# Patient Record
Sex: MALE | Race: WHITE | NOT HISPANIC OR LATINO | URBAN - METROPOLITAN AREA
[De-identification: names, ages, dates, MRNs, and addresses within clinical notes are randomized per-mention and may not be internally consistent; named-entity substitution may affect disease eponyms.]

---

## 2018-09-05 PROBLEM — Z00.00 ENCOUNTER FOR PREVENTIVE HEALTH EXAMINATION: Status: ACTIVE | Noted: 2018-09-05

## 2020-01-21 ENCOUNTER — INPATIENT (INPATIENT)
Facility: HOSPITAL | Age: 55
LOS: 2 days | Discharge: HOME | End: 2020-01-24
Attending: INTERNAL MEDICINE | Admitting: INTERNAL MEDICINE
Payer: COMMERCIAL

## 2020-01-21 VITALS
SYSTOLIC BLOOD PRESSURE: 147 MMHG | HEART RATE: 100 BPM | DIASTOLIC BLOOD PRESSURE: 94 MMHG | RESPIRATION RATE: 17 BRPM | TEMPERATURE: 98 F | OXYGEN SATURATION: 98 %

## 2020-01-21 DIAGNOSIS — F41.9 ANXIETY DISORDER, UNSPECIFIED: ICD-10-CM

## 2020-01-21 DIAGNOSIS — E86.0 DEHYDRATION: ICD-10-CM

## 2020-01-21 DIAGNOSIS — E87.6 HYPOKALEMIA: ICD-10-CM

## 2020-01-21 DIAGNOSIS — E87.1 HYPO-OSMOLALITY AND HYPONATREMIA: ICD-10-CM

## 2020-01-21 DIAGNOSIS — Z02.9 ENCOUNTER FOR ADMINISTRATIVE EXAMINATIONS, UNSPECIFIED: ICD-10-CM

## 2020-01-21 DIAGNOSIS — R07.9 CHEST PAIN, UNSPECIFIED: ICD-10-CM

## 2020-01-21 DIAGNOSIS — Z29.9 ENCOUNTER FOR PROPHYLACTIC MEASURES, UNSPECIFIED: ICD-10-CM

## 2020-01-21 DIAGNOSIS — I10 ESSENTIAL (PRIMARY) HYPERTENSION: ICD-10-CM

## 2020-01-21 LAB
ALBUMIN SERPL ELPH-MCNC: 3.7 G/DL — SIGNIFICANT CHANGE UP (ref 3.3–5)
ALP SERPL-CCNC: 57 U/L — SIGNIFICANT CHANGE UP (ref 40–120)
ALT FLD-CCNC: 23 U/L — SIGNIFICANT CHANGE UP (ref 4–41)
AMPHET UR-MCNC: NEGATIVE — SIGNIFICANT CHANGE UP
ANION GAP SERPL CALC-SCNC: 12 MMO/L — SIGNIFICANT CHANGE UP (ref 7–14)
APAP SERPL-MCNC: < 15 UG/ML — LOW (ref 15–25)
APPEARANCE UR: CLEAR — SIGNIFICANT CHANGE UP
AST SERPL-CCNC: 21 U/L — SIGNIFICANT CHANGE UP (ref 4–40)
BACTERIA # UR AUTO: NEGATIVE — SIGNIFICANT CHANGE UP
BARBITURATES UR SCN-MCNC: NEGATIVE — SIGNIFICANT CHANGE UP
BASE EXCESS BLDV CALC-SCNC: 1.5 MMOL/L — SIGNIFICANT CHANGE UP
BASOPHILS # BLD AUTO: 0.04 K/UL — SIGNIFICANT CHANGE UP (ref 0–0.2)
BASOPHILS NFR BLD AUTO: 0.4 % — SIGNIFICANT CHANGE UP (ref 0–2)
BENZODIAZ UR-MCNC: NEGATIVE — SIGNIFICANT CHANGE UP
BILIRUB SERPL-MCNC: 0.5 MG/DL — SIGNIFICANT CHANGE UP (ref 0.2–1.2)
BILIRUB UR-MCNC: NEGATIVE — SIGNIFICANT CHANGE UP
BLOOD GAS VENOUS - CREATININE: 0.68 MG/DL — SIGNIFICANT CHANGE UP (ref 0.5–1.3)
BLOOD GAS VENOUS - FIO2: 0 — SIGNIFICANT CHANGE UP
BLOOD UR QL VISUAL: NEGATIVE — SIGNIFICANT CHANGE UP
BUN SERPL-MCNC: 14 MG/DL — SIGNIFICANT CHANGE UP (ref 7–23)
CALCIUM SERPL-MCNC: 9.3 MG/DL — SIGNIFICANT CHANGE UP (ref 8.4–10.5)
CANNABINOIDS UR-MCNC: POSITIVE — SIGNIFICANT CHANGE UP
CHLORIDE BLDV-SCNC: 104 MMOL/L — SIGNIFICANT CHANGE UP (ref 96–108)
CHLORIDE SERPL-SCNC: 98 MMOL/L — SIGNIFICANT CHANGE UP (ref 98–107)
CK SERPL-CCNC: 128 U/L — SIGNIFICANT CHANGE UP (ref 30–200)
CO2 SERPL-SCNC: 23 MMOL/L — SIGNIFICANT CHANGE UP (ref 22–31)
COCAINE METAB.OTHER UR-MCNC: NEGATIVE — SIGNIFICANT CHANGE UP
COLOR SPEC: YELLOW — SIGNIFICANT CHANGE UP
CREAT SERPL-MCNC: 0.66 MG/DL — SIGNIFICANT CHANGE UP (ref 0.5–1.3)
EOSINOPHIL # BLD AUTO: 0.06 K/UL — SIGNIFICANT CHANGE UP (ref 0–0.5)
EOSINOPHIL NFR BLD AUTO: 0.6 % — SIGNIFICANT CHANGE UP (ref 0–6)
ETHANOL BLD-MCNC: < 10 MG/DL — SIGNIFICANT CHANGE UP
GAS PNL BLDV: 132 MMOL/L — LOW (ref 136–146)
GLUCOSE BLDV-MCNC: 104 MG/DL — HIGH (ref 70–99)
GLUCOSE SERPL-MCNC: 107 MG/DL — HIGH (ref 70–99)
GLUCOSE UR-MCNC: NEGATIVE — SIGNIFICANT CHANGE UP
HCO3 BLDV-SCNC: 26 MMOL/L — SIGNIFICANT CHANGE UP (ref 20–27)
HCT VFR BLD CALC: 34.6 % — LOW (ref 39–50)
HCT VFR BLDV CALC: 37.7 % — LOW (ref 39–51)
HGB BLD-MCNC: 12 G/DL — LOW (ref 13–17)
HGB BLDV-MCNC: 12.2 G/DL — LOW (ref 13–17)
HYALINE CASTS # UR AUTO: NEGATIVE — SIGNIFICANT CHANGE UP
IMM GRANULOCYTES NFR BLD AUTO: 0.4 % — SIGNIFICANT CHANGE UP (ref 0–1.5)
KETONES UR-MCNC: NEGATIVE — SIGNIFICANT CHANGE UP
LACTATE BLDV-MCNC: 1.1 MMOL/L — SIGNIFICANT CHANGE UP (ref 0.5–2)
LEUKOCYTE ESTERASE UR-ACNC: NEGATIVE — SIGNIFICANT CHANGE UP
LYMPHOCYTES # BLD AUTO: 1.45 K/UL — SIGNIFICANT CHANGE UP (ref 1–3.3)
LYMPHOCYTES # BLD AUTO: 13.8 % — SIGNIFICANT CHANGE UP (ref 13–44)
MCHC RBC-ENTMCNC: 31.4 PG — SIGNIFICANT CHANGE UP (ref 27–34)
MCHC RBC-ENTMCNC: 34.7 % — SIGNIFICANT CHANGE UP (ref 32–36)
MCV RBC AUTO: 90.6 FL — SIGNIFICANT CHANGE UP (ref 80–100)
METHADONE UR-MCNC: NEGATIVE — SIGNIFICANT CHANGE UP
MONOCYTES # BLD AUTO: 1.17 K/UL — HIGH (ref 0–0.9)
MONOCYTES NFR BLD AUTO: 11.1 % — SIGNIFICANT CHANGE UP (ref 2–14)
NEUTROPHILS # BLD AUTO: 7.78 K/UL — HIGH (ref 1.8–7.4)
NEUTROPHILS NFR BLD AUTO: 73.7 % — SIGNIFICANT CHANGE UP (ref 43–77)
NITRITE UR-MCNC: NEGATIVE — SIGNIFICANT CHANGE UP
NRBC # FLD: 0 K/UL — SIGNIFICANT CHANGE UP (ref 0–0)
OPIATES UR-MCNC: NEGATIVE — SIGNIFICANT CHANGE UP
OXYCODONE UR-MCNC: NEGATIVE — SIGNIFICANT CHANGE UP
PCO2 BLDV: 37 MMHG — LOW (ref 41–51)
PCP UR-MCNC: NEGATIVE — SIGNIFICANT CHANGE UP
PH BLDV: 7.45 PH — HIGH (ref 7.32–7.43)
PH UR: 6 — SIGNIFICANT CHANGE UP (ref 5–8)
PLATELET # BLD AUTO: 362 K/UL — SIGNIFICANT CHANGE UP (ref 150–400)
PMV BLD: 8.9 FL — SIGNIFICANT CHANGE UP (ref 7–13)
PO2 BLDV: 51 MMHG — HIGH (ref 35–40)
POTASSIUM BLDV-SCNC: 3 MMOL/L — LOW (ref 3.4–4.5)
POTASSIUM SERPL-MCNC: 3.2 MMOL/L — LOW (ref 3.5–5.3)
POTASSIUM SERPL-SCNC: 3.2 MMOL/L — LOW (ref 3.5–5.3)
PROT SERPL-MCNC: 6.8 G/DL — SIGNIFICANT CHANGE UP (ref 6–8.3)
PROT UR-MCNC: 20 — SIGNIFICANT CHANGE UP
RBC # BLD: 3.82 M/UL — LOW (ref 4.2–5.8)
RBC # FLD: 13.8 % — SIGNIFICANT CHANGE UP (ref 10.3–14.5)
RBC CASTS # UR COMP ASSIST: SIGNIFICANT CHANGE UP (ref 0–?)
SALICYLATES SERPL-MCNC: < 5 MG/DL — LOW (ref 15–30)
SAO2 % BLDV: 84 % — SIGNIFICANT CHANGE UP (ref 60–85)
SODIUM SERPL-SCNC: 133 MMOL/L — LOW (ref 135–145)
SP GR SPEC: 1.01 — SIGNIFICANT CHANGE UP (ref 1–1.04)
SQUAMOUS # UR AUTO: SIGNIFICANT CHANGE UP
TROPONIN T, HIGH SENSITIVITY: 14 NG/L — SIGNIFICANT CHANGE UP (ref ?–14)
TROPONIN T, HIGH SENSITIVITY: 15 NG/L — SIGNIFICANT CHANGE UP (ref ?–14)
TSH SERPL-MCNC: 1.94 UIU/ML — SIGNIFICANT CHANGE UP (ref 0.27–4.2)
UROBILINOGEN FLD QL: NORMAL — SIGNIFICANT CHANGE UP
WBC # BLD: 10.54 K/UL — HIGH (ref 3.8–10.5)
WBC # FLD AUTO: 10.54 K/UL — HIGH (ref 3.8–10.5)
WBC UR QL: SIGNIFICANT CHANGE UP (ref 0–?)

## 2020-01-21 PROCEDURE — 71046 X-RAY EXAM CHEST 2 VIEWS: CPT | Mod: 26

## 2020-01-21 PROCEDURE — 99223 1ST HOSP IP/OBS HIGH 75: CPT

## 2020-01-21 RX ORDER — ALPRAZOLAM 0.25 MG
0.5 TABLET ORAL ONCE
Refills: 0 | Status: DISCONTINUED | OUTPATIENT
Start: 2020-01-21 | End: 2020-01-21

## 2020-01-21 RX ORDER — LOSARTAN POTASSIUM 100 MG/1
1 TABLET, FILM COATED ORAL
Qty: 0 | Refills: 0 | DISCHARGE

## 2020-01-21 RX ORDER — LOSARTAN POTASSIUM 100 MG/1
50 TABLET, FILM COATED ORAL DAILY
Refills: 0 | Status: DISCONTINUED | OUTPATIENT
Start: 2020-01-21 | End: 2020-01-24

## 2020-01-21 RX ORDER — LANOLIN ALCOHOL/MO/W.PET/CERES
6 CREAM (GRAM) TOPICAL ONCE
Refills: 0 | Status: COMPLETED | OUTPATIENT
Start: 2020-01-21 | End: 2020-01-21

## 2020-01-21 RX ORDER — ONDANSETRON 8 MG/1
4 TABLET, FILM COATED ORAL ONCE
Refills: 0 | Status: COMPLETED | OUTPATIENT
Start: 2020-01-21 | End: 2020-01-21

## 2020-01-21 RX ORDER — AMLODIPINE BESYLATE 2.5 MG/1
5 TABLET ORAL DAILY
Refills: 0 | Status: DISCONTINUED | OUTPATIENT
Start: 2020-01-21 | End: 2020-01-24

## 2020-01-21 RX ORDER — POTASSIUM CHLORIDE 20 MEQ
40 PACKET (EA) ORAL ONCE
Refills: 0 | Status: COMPLETED | OUTPATIENT
Start: 2020-01-21 | End: 2020-01-21

## 2020-01-21 RX ORDER — MAGNESIUM SULFATE 500 MG/ML
1 VIAL (ML) INJECTION ONCE
Refills: 0 | Status: COMPLETED | OUTPATIENT
Start: 2020-01-21 | End: 2020-01-21

## 2020-01-21 RX ORDER — ESCITALOPRAM OXALATE 10 MG/1
1 TABLET, FILM COATED ORAL
Qty: 0 | Refills: 0 | DISCHARGE

## 2020-01-21 RX ORDER — SODIUM CHLORIDE 9 MG/ML
2000 INJECTION INTRAMUSCULAR; INTRAVENOUS; SUBCUTANEOUS ONCE
Refills: 0 | Status: COMPLETED | OUTPATIENT
Start: 2020-01-21 | End: 2020-01-21

## 2020-01-21 RX ORDER — ESCITALOPRAM OXALATE 10 MG/1
10 TABLET, FILM COATED ORAL DAILY
Refills: 0 | Status: DISCONTINUED | OUTPATIENT
Start: 2020-01-21 | End: 2020-01-24

## 2020-01-21 RX ORDER — ASPIRIN/CALCIUM CARB/MAGNESIUM 324 MG
162 TABLET ORAL ONCE
Refills: 0 | Status: COMPLETED | OUTPATIENT
Start: 2020-01-21 | End: 2020-01-21

## 2020-01-21 RX ADMIN — SODIUM CHLORIDE 2000 MILLILITER(S): 9 INJECTION INTRAMUSCULAR; INTRAVENOUS; SUBCUTANEOUS at 17:33

## 2020-01-21 RX ADMIN — Medication 1 MILLIGRAM(S): at 20:40

## 2020-01-21 RX ADMIN — Medication 40 MILLIEQUIVALENT(S): at 17:46

## 2020-01-21 RX ADMIN — Medication 6 MILLIGRAM(S): at 22:18

## 2020-01-21 RX ADMIN — SODIUM CHLORIDE 2000 MILLILITER(S): 9 INJECTION INTRAMUSCULAR; INTRAVENOUS; SUBCUTANEOUS at 15:42

## 2020-01-21 RX ADMIN — ONDANSETRON 4 MILLIGRAM(S): 8 TABLET, FILM COATED ORAL at 17:45

## 2020-01-21 RX ADMIN — Medication 0.1 MILLIGRAM(S): at 17:45

## 2020-01-21 RX ADMIN — Medication 100 GRAM(S): at 22:19

## 2020-01-21 RX ADMIN — Medication 162 MILLIGRAM(S): at 22:18

## 2020-01-21 NOTE — H&P ADULT - NSHPSOCIALHISTORY_GEN_ALL_CORE
Lives with wife  Former smoker 1/2 PPD x 20 years  Social alcohol   No illicit drug use  Weaned off Xanax recently   Works as a baker   Lives with wife  Former smoker 1/2 PPD x 20 years  Social alcohol   No illicit drug use  Weaned off Xanax recently   Works as a baker  Occasional cannabis use

## 2020-01-21 NOTE — ED ADULT NURSE NOTE - OBJECTIVE STATEMENT
54y m AAOx3 c/o "needing help" and for psychiatric evaluation. pt arrives in low acuity  with 2 person PES assist when walking. pt shows impaired walking gait with dragging feet. pt affect flat and has slow and flat responses to questions. pt stated that he may have taken more of his medications than prescribed. pt states he is feeling itchy all over his body and endorses pins/needles sensation with a headache, increased level and anxiety and states he "is too out of it". pt denies SI, HI, AH, and VH at this time. no visual traumas or injuries at this time. pt labs collected and sent, PES within arms reach of stretcher, pt receiving IV fluids at this time. will continue to monitor

## 2020-01-21 NOTE — ED ADULT TRIAGE NOTE - CHIEF COMPLAINT QUOTE
pt c/o insomnia (5 days no sleep), increased anxiety with chest pain. pt noted to have dystonic movements in triage. [employer] to triage, pt for ,.

## 2020-01-21 NOTE — H&P ADULT - PROBLEM SELECTOR PLAN 7
1.  Name of PCP: Dr. Dean Mitchell  2.  PCP Contacted on Admission: [ ] Y    [ ] N    3.  PCP contacted at Discharge: [ ] Y    [ ] N    [ ] N/A  4.  Post-Discharge Appointment Date and Location:  5.  Summary of Handoff given to PCP: Low risk for DVT, no ppx

## 2020-01-21 NOTE — H&P ADULT - NSHPLABSRESULTS_GEN_ALL_CORE
CXR - clear lungs, no pleural effusions - my reading CXR - clear lungs, no pleural effusions - my reading    EKG, 1/21, NSR, 87bpm, QTc 466, no acute Tw or ST changes, LVH - my reading

## 2020-01-21 NOTE — SBIRT NOTE ADULT - NSSBIRTDRGBRIEFINTDET_GEN_A_CORE
Provided SBIRT services: Full screen positive. Brief Intervention Performed. Screening results were reviewed with the patient and patient was provided information about healthy guidelines and potential negative consequences associated with level of risk. Motivation and readiness to reduce or stop use was discussed and goals and activities to make changes were suggested/offered.  Pt is not appropriate for detox due to last use of opioids and benzos being more than 3 weeks ago. Offered pt additional substance use treatment referrals- pt declined. Pt reported attending NA meetings.

## 2020-01-21 NOTE — H&P ADULT - ASSESSMENT
55yo Male, with FHx of MI (father at the age of 65), MHx of anxiety, HTN, self-reported h/o Xanax dependence; a/w CP and severe anxiety

## 2020-01-21 NOTE — H&P ADULT - HISTORY OF PRESENT ILLNESS
53yo Male, with MHx of anxiety with c/o dizziness, nausea, midsternal chest pressure with no radiation, anxiety. Pt accompanied by wife, Danielle , who states  today patient took extra Lexapro Buspar and atarax and after that started to be dizzy, c/o nausea and very tired. Pt with very short attention spam, forgetful and states does not know where he is. States he sees a psychologist for the last couple of yr for anxiety, who put him on Lexapro and today he felt very anxious and took some additional ones. 53yo Male, with FHx of MI (father at the age of 65), MHx of anxiety, HTN, self-reported h/o Xanax dependence; c/o dizziness, nausea, midsternal chest pressure, 6/10, with no radiation, lasting 20 min without provoking factors improved with rest and associated anxiety and nasuea. While in ED the pt was accompanied by his wife, Danielle [phone], who stated that the pt took extra Lexapro Buspar and Atarax today and after that started to be "dizzy", also c/o nausea and being very tired. Per the spouse the pt has very short attention spam and is forgetful. Pt states that has been seeing a psychologist for anxiety for the last couple of years, who put him on Lexapro. Today he felt very anxious and took "some" additional Lexapro tabs. PT states that he feels extremely anxious and needs to rest but does not want to take Xanax as he was just weaned off it. Says that did not have "good sleep" for the past few months and is very tired and requesting to be given "something" that would give him "good long" sleep. Says that had a stress test 1 year ago which was normal; 53yo Male, with FHx of MI (father at the age of 65), MHx of anxiety, HTN, self-reported h/o Xanax dependence; c/o dizziness, nausea, midsternal chest pressure, 6/10, with no radiation, lasting 20 min without provoking factors improved with rest and associated anxiety and nasuea. While in ED the pt was accompanied by his wife, Danielle [phone], who stated that the pt took extra Lexapro (x2,  5mg Tabs) Buspar and Atarax today and after that started to be "dizzy", also c/o nausea and being very tired. Per the spouse the pt has very short attention spam and is forgetful. Pt states that has been seeing a psychologist for anxiety for the last couple of years, who put him on Lexapro. Today he felt very anxious and took "some" additional Lexapro tabs. PT states that he feels extremely anxious and needs to rest but does not want to take Xanax as he was just weaned off it. Says that did not have "good sleep" for the past few months and is very tired and requesting to be given "something" that would give him "good long" sleep. Says that had a stress test 1 year ago which was normal; 55yo Male, former smoker, with FHx of MI (father at the age of 65), MHx of anxiety, HTN, self-reported h/o Xanax dependence; c/o dizziness, nausea, midsternal chest pressure, 6/10, with no radiation, lasting 20 min without provoking factors improved with rest and associated anxiety and nasuea. While in ED the pt was accompanied by his wife, Danielle [phone], who stated that the pt took extra Lexapro (x2,  5mg Tabs) Buspar and Atarax today and after that started to be "dizzy", also c/o nausea and being very tired. Per the spouse the pt has very short attention spam and is forgetful. Pt states that has been seeing a psychologist for anxiety for the last couple of years, who put him on Lexapro. Today he felt very anxious and took "some" additional Lexapro tabs. PT states that he feels extremely anxious and needs to rest but does not want to take Xanax as he was just weaned off it. Says that did not have "good sleep" for the past few months and is very tired and requesting to be given "something" that would give him "good long" sleep. Says that had a stress test 1 year ago which was normal; 55yo Male, former smoker, with FHx of MI (father at the age of 65), MHx of anxiety d/o, HTN, self-reported h/o Xanax dependence; c/o dizziness, nausea, midsternal chest pressure, 6/10, with no radiation, lasting 20 min without provoking factors improved with rest and associated anxiety and nasuea. While in ED the pt was accompanied by his wife, Danielle [phone], who stated that the pt took extra Lexapro (x2,  5mg Tabs) Buspar and Atarax today and after that started to be "dizzy", also c/o nausea and being very tired. Per the spouse the pt has very short attention spam and is forgetful. Pt states that has been seeing a psychologist for anxiety for the last couple of years, who put him on Lexapro. Today he felt very anxious and took "some" additional Lexapro tabs. PT states that he feels extremely anxious and needs to rest but does not want to take Xanax as he was just weaned off it. Says that did not have "good sleep" for the past few months and is very tired and requesting to be given "something" that would give him "good long" sleep. Says that had a stress test 1 year ago which was normal;

## 2020-01-21 NOTE — H&P ADULT - PROBLEM SELECTOR PLAN 8
1.  Name of PCP: Dr. Dean Mitchell  2.  PCP Contacted on Admission: [ ] Y    [ ] N    3.  PCP contacted at Discharge: [ ] Y    [ ] N    [ ] N/A  4.  Post-Discharge Appointment Date and Location:  5.  Summary of Handoff given to PCP:

## 2020-01-21 NOTE — ED PROVIDER NOTE - CONSTITUTIONAL, MLM
normal... Well appearing, awake, alert, oriented to person, place, time/situation and in no apparent distress. Awake, lethargic, oriented to person, , time.

## 2020-01-21 NOTE — ED PROVIDER NOTE - CLINICAL SUMMARY MEDICAL DECISION MAKING FREE TEXT BOX
This is a 54 yr old M, pmh anxiety with c/o dizziness, nausea, chest pressure, anxiety. Pt bib wife from home. ( Danielle )  who states  today patient took extra lexapro, buspar and atarax and after that started to be dizzy, c/o nausea and very tired. Wife states couple of month ago he wanted to quit smoking and had a pulmonology follow up and they put him on xanax to help him with anxiety do to quit smoking. Pt abused xanax and approximately 2 wks ago, he had to go to rehab to detox from benzos.   Screening labs, ua tox/serum, ck, and venous blood gas to r/o underlying medical issues and toxicity.

## 2020-01-21 NOTE — ED PROVIDER NOTE - OBJECTIVE STATEMENT
This is a 54 yr old M, pmh anxiety with c/o dizziness, nausea, chest pressure, anxiety. Pt bib wife from home. ( Danielle ) This is a 54 yr old M, pmh anxiety with c/o dizziness, nausea, chest pressure, anxiety. Pt bib wife from home. ( Danielle )  who states  today patient took extra lexapro, buspar and atarax and after that started to be dizzy, c/o nausea and very tired.   Pt with very short attention spam, forgetful and states does not know where he is. States he sees a psychologist for the last couple of yr for anxiety, who put him on lexapro and today he felt very anxious and took some additional ones.

## 2020-01-21 NOTE — H&P ADULT - PSYCHIATRIC COMMENTS
very hyperactive, requires constant redirection, keeps requesting sleep medication and something for anxiety

## 2020-01-21 NOTE — ED ADULT NURSE REASSESSMENT NOTE - NS ED NURSE REASSESS COMMENT FT1
Patient received from [facility] ED behavioral health, patient restless but redirectable, handoff report received from  LEONORA Pepper. Pending MD assessment. Will continue to monitor.

## 2020-01-21 NOTE — H&P ADULT - PROBLEM SELECTOR PLAN 2
-Avoid Xanax as the pt was weaned off it recently   -Ativan x1 IV in ED  -c/w home regimen -Avoid Xanax as the pt was weaned off it recently   -Ativan x1 IV in ED  -Consider Psych c/s (primary team)   -c/w home regimen

## 2020-01-21 NOTE — H&P ADULT - PROBLEM SELECTOR PLAN 3
Sodium 133; On Lexapro, low dose;  -Monitor closely as on SSRI, may need to hold if sodium trends down Dry MM; BUN: Scr ratio >20  -s/p 2L NS in ED

## 2020-01-21 NOTE — ED PROVIDER NOTE - ATTENDING CONTRIBUTION TO CARE
54M p/w chest pain and nausea - pt took extra buspar, lexapro and atarax prior to arrival due to anxiety, felt dizzy, nausea, CP; pt reporting CP unble to sleep x 5 weeks.  Initially lethargic, poor focus; hx obtained from wife.  Check labs and rx'ed IVF.  Pt misused the xanax and abused it x months, went to rehab for xanax.  Rx'ed fluids.  Pt was c/o leg cramps, poor sleep.  Moved to main for lethargy and fluids.  Initially lethargic and couldn't walk, now agitated and anxious.  COWS score 8 mild bzd w/d.  UTox neg for BZD.   EKG SR at 87 no heena no std (+)LVH.  tw flattening aVL.  Pt very anxious and tremulous, exam otherwise benign.  Reports Cards Dr. Mitchell.  No old EKGs to c/w. 54M p/w chest pain and nausea - pt took extra buspar, lexapro and atarax prior to arrival due to anxiety, felt dizzy, nausea, CP; pt reporting CP unble to sleep x 5 weeks.  Initially lethargic, poor focus; hx obtained from wife.  Check labs and rx'ed IVF.  Pt misused the xanax and abused it x months, went to rehab for xanax.  Rx'ed fluids.  Pt was c/o leg cramps, poor sleep.  Moved to main for lethargy and fluids.  Initially lethargic and couldn't walk, now agitated and anxious.  COWS score 8 mild bzd w/d.  UTox neg for BZD.   EKG SR at 87 no heena no std (+)LVH.  tw flattening aVL.  Pt very anxious and tremulous, exam otherwise benign.  Reports Cards Dr. Mitchell.  No old EKGs to c/w.  VS:  unremarkable    GEN - Anxious and tremulous   A+O x3   HEAD - NC/AT     ENT - PEERL, EOMI, mucous membranes   dry, no discharge      NECK: Neck supple, non-tender without lymphadenopathy, no masses, no JVD  PULM - CTA b/l,  symmetric breath sounds  COR -  normal heart sounds    ABD - , ND, NT, soft,  BACK - no CVA tenderness, nontender spine     EXTREMS - no edema, no deformity, warm and well perfused    SKIN - no rash    or bruising      NEUROLOGIC - alert, face symmetric, speech fluent, sensation nl, motor no focal deficit.

## 2020-01-21 NOTE — ED PROVIDER NOTE - PROGRESS NOTE DETAILS
Fer NP- Patient  evaluated and found he is very lethargic, outbound. Started labs and IVF. Collateral info obtained from wife Danielle- who states  today patient took extra lexapro, buspar and atarax and after that started to be dizzy, c/o nausea and very tired.   I recommend further medical workup, reached out to charge RN for a main placement. DD ED ATTG:  feeling better s/p clonidine.  Agreeable to admission for CP r/o MI.  will send rpt trop

## 2020-01-22 DIAGNOSIS — F13.21 SEDATIVE, HYPNOTIC OR ANXIOLYTIC DEPENDENCE, IN REMISSION: ICD-10-CM

## 2020-01-22 LAB
ANION GAP SERPL CALC-SCNC: 11 MMO/L — SIGNIFICANT CHANGE UP (ref 7–14)
BASOPHILS # BLD AUTO: 0.04 K/UL — SIGNIFICANT CHANGE UP (ref 0–0.2)
BASOPHILS NFR BLD AUTO: 0.6 % — SIGNIFICANT CHANGE UP (ref 0–2)
BUN SERPL-MCNC: 13 MG/DL — SIGNIFICANT CHANGE UP (ref 7–23)
CALCIUM SERPL-MCNC: 9.1 MG/DL — SIGNIFICANT CHANGE UP (ref 8.4–10.5)
CHLORIDE SERPL-SCNC: 105 MMOL/L — SIGNIFICANT CHANGE UP (ref 98–107)
CHOLEST SERPL-MCNC: 97 MG/DL — LOW (ref 120–199)
CO2 SERPL-SCNC: 22 MMOL/L — SIGNIFICANT CHANGE UP (ref 22–31)
CREAT SERPL-MCNC: 0.68 MG/DL — SIGNIFICANT CHANGE UP (ref 0.5–1.3)
EOSINOPHIL # BLD AUTO: 0.13 K/UL — SIGNIFICANT CHANGE UP (ref 0–0.5)
EOSINOPHIL NFR BLD AUTO: 1.9 % — SIGNIFICANT CHANGE UP (ref 0–6)
GLUCOSE BLDC GLUCOMTR-MCNC: 147 MG/DL — HIGH (ref 70–99)
GLUCOSE SERPL-MCNC: 108 MG/DL — HIGH (ref 70–99)
HBA1C BLD-MCNC: 5.1 % — SIGNIFICANT CHANGE UP (ref 4–5.6)
HCT VFR BLD CALC: 34.6 % — LOW (ref 39–50)
HDLC SERPL-MCNC: 39 MG/DL — SIGNIFICANT CHANGE UP (ref 35–55)
HGB BLD-MCNC: 11.5 G/DL — LOW (ref 13–17)
IMM GRANULOCYTES NFR BLD AUTO: 0.3 % — SIGNIFICANT CHANGE UP (ref 0–1.5)
LIPID PNL WITH DIRECT LDL SERPL: 42 MG/DL — SIGNIFICANT CHANGE UP
LYMPHOCYTES # BLD AUTO: 1.36 K/UL — SIGNIFICANT CHANGE UP (ref 1–3.3)
LYMPHOCYTES # BLD AUTO: 20.2 % — SIGNIFICANT CHANGE UP (ref 13–44)
MAGNESIUM SERPL-MCNC: 1.9 MG/DL — SIGNIFICANT CHANGE UP (ref 1.6–2.6)
MCHC RBC-ENTMCNC: 30.9 PG — SIGNIFICANT CHANGE UP (ref 27–34)
MCHC RBC-ENTMCNC: 33.2 % — SIGNIFICANT CHANGE UP (ref 32–36)
MCV RBC AUTO: 93 FL — SIGNIFICANT CHANGE UP (ref 80–100)
MONOCYTES # BLD AUTO: 0.66 K/UL — SIGNIFICANT CHANGE UP (ref 0–0.9)
MONOCYTES NFR BLD AUTO: 9.8 % — SIGNIFICANT CHANGE UP (ref 2–14)
NEUTROPHILS # BLD AUTO: 4.51 K/UL — SIGNIFICANT CHANGE UP (ref 1.8–7.4)
NEUTROPHILS NFR BLD AUTO: 67.2 % — SIGNIFICANT CHANGE UP (ref 43–77)
NRBC # FLD: 0 K/UL — SIGNIFICANT CHANGE UP (ref 0–0)
PHOSPHATE SERPL-MCNC: 2.4 MG/DL — LOW (ref 2.5–4.5)
PLATELET # BLD AUTO: 371 K/UL — SIGNIFICANT CHANGE UP (ref 150–400)
PMV BLD: 9.6 FL — SIGNIFICANT CHANGE UP (ref 7–13)
POTASSIUM SERPL-MCNC: 3.6 MMOL/L — SIGNIFICANT CHANGE UP (ref 3.5–5.3)
POTASSIUM SERPL-SCNC: 3.6 MMOL/L — SIGNIFICANT CHANGE UP (ref 3.5–5.3)
RBC # BLD: 3.72 M/UL — LOW (ref 4.2–5.8)
RBC # FLD: 14 % — SIGNIFICANT CHANGE UP (ref 10.3–14.5)
SODIUM SERPL-SCNC: 138 MMOL/L — SIGNIFICANT CHANGE UP (ref 135–145)
TRIGL SERPL-MCNC: 75 MG/DL — SIGNIFICANT CHANGE UP (ref 10–149)
WBC # BLD: 6.72 K/UL — SIGNIFICANT CHANGE UP (ref 3.8–10.5)
WBC # FLD AUTO: 6.72 K/UL — SIGNIFICANT CHANGE UP (ref 3.8–10.5)

## 2020-01-22 PROCEDURE — 93306 TTE W/DOPPLER COMPLETE: CPT | Mod: 26

## 2020-01-22 PROCEDURE — 90792 PSYCH DIAG EVAL W/MED SRVCS: CPT

## 2020-01-22 RX ORDER — DIPHENHYDRAMINE HCL 50 MG
50 CAPSULE ORAL ONCE
Refills: 0 | Status: COMPLETED | OUTPATIENT
Start: 2020-01-22 | End: 2020-01-22

## 2020-01-22 RX ORDER — LANOLIN ALCOHOL/MO/W.PET/CERES
6 CREAM (GRAM) TOPICAL AT BEDTIME
Refills: 0 | Status: DISCONTINUED | OUTPATIENT
Start: 2020-01-22 | End: 2020-01-24

## 2020-01-22 RX ORDER — NICOTINE POLACRILEX 2 MG
1 GUM BUCCAL DAILY
Refills: 0 | Status: DISCONTINUED | OUTPATIENT
Start: 2020-01-22 | End: 2020-01-24

## 2020-01-22 RX ORDER — QUETIAPINE FUMARATE 200 MG/1
25 TABLET, FILM COATED ORAL EVERY 6 HOURS
Refills: 0 | Status: DISCONTINUED | OUTPATIENT
Start: 2020-01-22 | End: 2020-01-24

## 2020-01-22 RX ORDER — QUETIAPINE FUMARATE 200 MG/1
25 TABLET, FILM COATED ORAL AT BEDTIME
Refills: 0 | Status: DISCONTINUED | OUTPATIENT
Start: 2020-01-22 | End: 2020-01-24

## 2020-01-22 RX ORDER — ASPIRIN/CALCIUM CARB/MAGNESIUM 324 MG
81 TABLET ORAL DAILY
Refills: 0 | Status: DISCONTINUED | OUTPATIENT
Start: 2020-01-22 | End: 2020-01-24

## 2020-01-22 RX ORDER — QUETIAPINE FUMARATE 200 MG/1
25 TABLET, FILM COATED ORAL ONCE
Refills: 0 | Status: COMPLETED | OUTPATIENT
Start: 2020-01-22 | End: 2020-01-23

## 2020-01-22 RX ADMIN — Medication 1 PATCH: at 21:22

## 2020-01-22 RX ADMIN — Medication 50 MILLIGRAM(S): at 02:06

## 2020-01-22 RX ADMIN — Medication 6 MILLIGRAM(S): at 21:22

## 2020-01-22 RX ADMIN — QUETIAPINE FUMARATE 25 MILLIGRAM(S): 200 TABLET, FILM COATED ORAL at 21:22

## 2020-01-22 RX ADMIN — Medication 5 MILLIGRAM(S): at 10:40

## 2020-01-22 RX ADMIN — AMLODIPINE BESYLATE 5 MILLIGRAM(S): 2.5 TABLET ORAL at 06:18

## 2020-01-22 RX ADMIN — Medication 1 PATCH: at 12:47

## 2020-01-22 RX ADMIN — Medication 81 MILLIGRAM(S): at 18:35

## 2020-01-22 RX ADMIN — ESCITALOPRAM OXALATE 10 MILLIGRAM(S): 10 TABLET, FILM COATED ORAL at 12:15

## 2020-01-22 RX ADMIN — QUETIAPINE FUMARATE 25 MILLIGRAM(S): 200 TABLET, FILM COATED ORAL at 18:35

## 2020-01-22 RX ADMIN — LOSARTAN POTASSIUM 50 MILLIGRAM(S): 100 TABLET, FILM COATED ORAL at 06:18

## 2020-01-22 RX ADMIN — Medication 5 MILLIGRAM(S): at 18:35

## 2020-01-22 NOTE — CONSULT NOTE ADULT - SUBJECTIVE AND OBJECTIVE BOX
CARDIOLOGY CONSULT - Dr. Leach     CHIEF COMPLAINT: chest pain      HPI:  55yo Male, former smoker, with FHx of MI (father at the age of 65), MHx of anxiety d/o, HTN, self-reported h/o Xanax dependence; c/o dizziness, nausea, midsternal chest pressure, 6/10, with no radiation, lasting 20 min without provoking factors improved with rest and associated anxiety and nasuea. While in ED the pt was accompanied by his wife, Danielle, who stated that the pt took extra Lexapro (x2,  5mg Tabs) Buspar and Atarax today and after that started to be "dizzy", also c/o nausea and being very tired. Per the spouse the pt has very short attention spam and is forgetful. Pt states that has been seeing a psychologist for anxiety for the last couple of years, who put him on Lexapro. Today he felt very anxious and took "some" additional Lexapro tabs. PT states that he feels extremely anxious and needs to rest but does not want to take Xanax as he was just weaned off it. Says that did not have "good sleep" for the past few months. Pt. denies sob, palpitations, exertional symptoms, LE edema.     PAST MEDICAL & SURGICAL HISTORY:  HTN (hypertension)  Anxiety          PREVIOUS DIAGNOSTIC TESTING:    [ ] Echocardiogram:   [ ]  Catheterization:   [ ] Stress Test:  	    MEDICATIONS:  MEDICATIONS  (STANDING):  amLODIPine   Tablet 5 milliGRAM(s) Oral daily  busPIRone 5 milliGRAM(s) Oral two times a day  escitalopram 10 milliGRAM(s) Oral daily  losartan 50 milliGRAM(s) Oral daily      FAMILY HISTORY:  FH: heart attack: father, age 65      SOCIAL HISTORY:    [ x] Non-smoker  [ ] Smoker  [ ] Alcohol    Allergies    Allergy Status Unknown    Intolerances    	    REVIEW OF SYSTEMS:  CONSTITUTIONAL: No fever, weight loss, or fatigue  EYES: No eye pain, visual disturbances, or discharge  ENMT:  No difficulty hearing, tinnitus, vertigo; No sinus or throat pain  NECK: No pain or stiffness  RESPIRATORY: No cough, wheezing, chills or hemoptysis; No Shortness of Breath  CARDIOVASCULAR: No chest pain, palpitations, passing out, dizziness, or leg swelling  GASTROINTESTINAL: No abdominal or epigastric pain. No nausea, vomiting, or hematemesis; No diarrhea or constipation. No melena or hematochezia.  GENITOURINARY: No dysuria, frequency, hematuria, or incontinence  NEUROLOGICAL: No headaches, memory loss, loss of strength, numbness, or tremors  SKIN: No itching, burning, rashes, or lesions   	    [ ] All others negative	  [ ] Unable to obtain    PHYSICAL EXAM:  T(C): 36.9 (01-22-20 @ 05:33), Max: 37 (01-22-20 @ 01:47)  HR: 73 (01-22-20 @ 05:33) (73 - 103)  BP: 118/65 (01-22-20 @ 05:33) (106/61 - 183/101)  RR: 18 (01-22-20 @ 05:33) (14 - 18)  SpO2: 100% (01-22-20 @ 05:33) (98% - 100%)  Wt(kg): --  I&O's Summary      Appearance: Normal	  Psychiatry: A & O x 3, Mood & affect appropriate  HEENT:   Normal oral mucosa, PERRL, EOMI	  Lymphatic: No lymphadenopathy  Cardiovascular: Normal S1 S2,RRR, No JVD, No murmurs  Respiratory: Lungs clear to auscultation	  Gastrointestinal:  Soft, Non-tender, + BS	  Skin: No rashes, No ecchymoses, No cyanosis	  Neurologic: Non-focal  Extremities: Normal range of motion, No clubbing, cyanosis or edema  Vascular: Peripheral pulses palpable 2+ bilaterally    TELEMETRY: 	    ECG:  	nsr 88BPM, lvh   RADIOLOGY: < from: Xray Chest 2 Views PA/Lat (01.21.20 @ 18:16) >  IMPRESSION:    Clear lungs.    < end of copied text >    OTHER: 	  	  LABS:	 	    CARDIAC MARKERS:                                  11.5   6.72  )-----------( 371      ( 22 Jan 2020 06:00 )             34.6     01-22    138  |  105  |  13  ----------------------------<  108<H>  3.6   |  22  |  0.68    Ca    9.1      22 Jan 2020 06:00  Phos  2.4     01-22  Mg     1.9     01-22    TPro  6.8  /  Alb  3.7  /  TBili  0.5  /  DBili  x   /  AST  21  /  ALT  23  /  AlkPhos  57  01-21      proBNP:   Lipid Profile:   HgA1c: Hemoglobin A1C, Whole Blood: 5.1 % (01-22 @ 06:00)    TSH: Thyroid Stimulating Hormone, Serum: 1.94 uIU/mL (01-21 @ 15:30) CARDIOLOGY CONSULT - Dr. Leach     CHIEF COMPLAINT: chest pain      HPI:  53yo Male, former smoker, with Family history of MI in father (first MI in 50s s/p stents, then  at 65 2/2 to Mi), hx of anxiety d/o, HTN, self-reported h/o Xanax dependence; c/o dizziness, nausea, midsternal chest pressure, 6/10, with no radiation, lasting 20 min without provoking factors improved with rest and associated anxiety and nasuea. While in ED the pt was accompanied by his wife, Danielle, who stated that the pt took extra Lexapro (x2,  5mg Tabs) Buspar and Atarax today and after that started to be "dizzy", also c/o nausea and being very tired. Per the spouse the pt has very short attention spam and is forgetful. Pt states that has been seeing a psychologist for anxiety for the last couple of years, who put him on Lexapro. Today he felt very anxious and took "some" additional Lexapro tabs. PT states that he feels extremely anxious and needs to rest but does not want to take Xanax as he was just weaned off it. Says that did not have "good sleep" for the past few months. Pt. denies sob, palpitations, exertional symptoms, LE edema. Pt. states he sees Dr. Dean Mitchell [facility], recently seen approx. 6 months ago, has routine stress testing and echo which was normal.  Pt. very anxious at time of assessment, stating he needs something to help him sleep, requesting ativan. Primary team notified.     PAST MEDICAL & SURGICAL HISTORY:  HTN (hypertension)  Anxiety          PREVIOUS DIAGNOSTIC TESTING:    [ ] Echocardiogram:   [ ]  Catheterization:   [ ] Stress Test:  	    MEDICATIONS:  MEDICATIONS  (STANDING):  amLODIPine   Tablet 5 milliGRAM(s) Oral daily  busPIRone 5 milliGRAM(s) Oral two times a day  escitalopram 10 milliGRAM(s) Oral daily  losartan 50 milliGRAM(s) Oral daily      FAMILY HISTORY:  FH: heart attack: father, age 65      SOCIAL HISTORY:    [ x] Non-smoker  [ ] Smoker  [ ] Alcohol    Allergies    Allergy Status Unknown    Intolerances    	    REVIEW OF SYSTEMS:  CONSTITUTIONAL: No fever, weight loss, or fatigue  EYES: No eye pain, visual disturbances, or discharge  ENMT:  No difficulty hearing, tinnitus, vertigo; No sinus or throat pain  NECK: No pain or stiffness  RESPIRATORY: No cough, wheezing, chills or hemoptysis; No Shortness of Breath  CARDIOVASCULAR: No chest pain, palpitations, passing out, dizziness, or leg swelling  GASTROINTESTINAL: No abdominal or epigastric pain. No nausea, vomiting, or hematemesis; No diarrhea or constipation. No melena or hematochezia.  GENITOURINARY: No dysuria, frequency, hematuria, or incontinence  NEUROLOGICAL: No headaches, memory loss, loss of strength, numbness, or tremors  SKIN: No itching, burning, rashes, or lesions   	    [ ] All others negative	  [ ] Unable to obtain    PHYSICAL EXAM:  T(C): 36.9 (20 @ 05:33), Max: 37 (20 @ 01:47)  HR: 73 (20 @ 05:33) (73 - 103)  BP: 118/65 (20 @ 05:33) (106/61 - 183/101)  RR: 18 (20 @ 05:33) (14 - 18)  SpO2: 100% (20 @ 05:33) (98% - 100%)  Wt(kg): --  I&O's Summary      Appearance: anxious   Psychiatry: A & O x 3, Mood & affect appropriate  HEENT:   Normal oral mucosa, PERRL, EOMI	  Lymphatic: No lymphadenopathy  Cardiovascular: Normal S1 S2,RRR, No JVD, No murmurs  Respiratory: Lungs clear to auscultation	  Gastrointestinal:  Soft, Non-tender, + BS	  Skin: No rashes, No ecchymoses, No cyanosis	  Neurologic: Non-focal  Extremities: Normal range of motion, No clubbing, cyanosis or edema  Vascular: Peripheral pulses palpable 2+ bilaterally    TELEMETRY: 	    ECG:  	nsr 88BPM, lvh   RADIOLOGY: < from: Xray Chest 2 Views PA/Lat (20 @ 18:16) >  IMPRESSION:    Clear lungs.    < end of copied text >    OTHER: 	  	  LABS:	 	    CARDIAC MARKERS:                                  11.5   6.72  )-----------( 371      ( 2020 06:00 )             34.6         138  |  105  |  13  ----------------------------<  108<H>  3.6   |  22  |  0.68    Ca    9.1      2020 06:00  Phos  2.4       Mg     1.9         TPro  6.8  /  Alb  3.7  /  TBili  0.5  /  DBili  x   /  AST  21  /  ALT  23  /  AlkPhos  57        proBNP:   Lipid Profile:   HgA1c: Hemoglobin A1C, Whole Blood: 5.1 % ( @ 06:00)    TSH: Thyroid Stimulating Hormone, Serum: 1.94 uIU/mL ( @ 15:30)

## 2020-01-22 NOTE — BEHAVIORAL HEALTH ASSESSMENT NOTE - NSBHCHARTREVIEWVS_PSY_A_CORE FT
Vital Signs Last 24 Hrs  T(C): 36.6 (22 Jan 2020 15:40), Max: 37 (22 Jan 2020 01:47)  T(F): 97.9 (22 Jan 2020 15:40), Max: 98.6 (22 Jan 2020 01:47)  HR: 85 (22 Jan 2020 15:40) (73 - 90)  BP: 149/91 (22 Jan 2020 15:40) (106/61 - 149/91)  BP(mean): --  RR: 19 (22 Jan 2020 15:40) (14 - 19)  SpO2: 97% (22 Jan 2020 15:40) (97% - 100%)

## 2020-01-22 NOTE — PROGRESS NOTE ADULT - PROBLEM SELECTOR PLAN 1
Cardiac vs psychiatric etiology due to anxiety vs panic ?attack;  Former smoker, FHx of MI in father, age 65; Low suspicion of ACS at this time;   -Telemetry  -Trend CE  -Cardiology c/s appreciated. outpt card Dr. Dean Mitchell  -EKG: no acute Tw or ST changes, repeat PRN CP  -TTE  -TSH wnl  -CXR: not contributory   -f/u A1c, Lipid panel  - NM stress test given family history

## 2020-01-22 NOTE — BEHAVIORAL HEALTH ASSESSMENT NOTE - HPI (INCLUDE ILLNESS QUALITY, SEVERITY, DURATION, TIMING, CONTEXT, MODIFYING FACTORS, ASSOCIATED SIGNS AND SYMPTOMS)
53yo Male, PPH of generalized anxiety d/o, former benzo abuse now in remission, PMH of HTN, admitted for chest pain and cardiac workup. Psych consult for anxiety.    Patient very anxious on exam, repeatedly asks if writer can help with sleep. States that he began to feel anxious >7mo ago, after which he began to see NP for psych at [facility] who gave him lexapro and xanax, but patient began to abuse xanax which caused worsening anxiety, insomnia, after which he detox from it by admitting himself to [facility] >3 weeks ago. Denies depression or anhedonia, feels "numb" at times due to lack of sleep, has weight loss and mild loss of appetite, feels fatigued restless all the time. Denies SI/I/P or Hi/I/P. 55yo Male, PPH of generalized anxiety d/o, former benzo abuse now in remission, PMH of HTN, admitted for chest pain and cardiac workup. Psych consult for anxiety.    Patient very anxious on exam, repeatedly asks if writer can help with sleep. States that he began to feel anxious >7mo ago, after which he began to see NP for psych at [facility] who gave him lexapro and xanax, but patient began to abuse xanax which caused worsening anxiety, insomnia, after which he detox from it by admitting himself to [facility] >3 weeks ago. Denies depression or anhedonia, feels "numb" at times due to lack of sleep, has weight loss and mild loss of appetite, feels fatigued restless all the time. Denies SI/I/P or Hi/I/P. Denies AH/VH, delusions or paranoia. 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     Wife corrborates above account, denies recent benzo abuse or etoh abuse, denies safety concerns.

## 2020-01-22 NOTE — BEHAVIORAL HEALTH ASSESSMENT NOTE - NSBHCONSULTMEDS_PSY_A_CORE FT
- begin seroquel 25mg qHS standing for insomnia, IF patient does not achieve sleep after 1h of seroquel dose OK to give another 25mg dose (total 50mg qHS)  - begin seroquel 12.5mg q6h prn anxiety  - begin melatonin 6mg qHS standing  - holistic RN for relaxation therapties/techniques - begin seroquel 25mg qHS standing for insomnia, IF patient does not achieve sleep after 1h of seroquel dose OK to give another 25mg dose (total 50mg qHS)  - begin seroquel 25mg q6h prn anxiety  - begin melatonin 6mg qHS standing  - holistic RN for relaxation therapties/techniques

## 2020-01-22 NOTE — BEHAVIORAL HEALTH ASSESSMENT NOTE - SUMMARY
55yo Male, PPH of generalized anxiety d/o, former benzo abuse now in remission, PMH of HTN, admitted for chest pain and cardiac workup. Psych consult for anxiety.    Patient meets criterion for generalized anxiety d/o. Would benefit from higher lexapro dose and secondary acute anxiety agent that does not have addictive or tolerance building properties. Did poorly on vistaril in past, became addicted and tolerant to benzos in recent past would avoid class.     Reviewed off label use of seroquel for anxiety, insomnia, mood d/o. Amenable to use as standing agent and PRN. QTc<500. Risks/benefits discussed.

## 2020-01-22 NOTE — PROGRESS NOTE ADULT - PROBLEM SELECTOR PLAN 2
-Avoid Xanax as the pt was weaned off it recently   -Ativan x1 IV in ED  -Psych c/s appreciated, QHS Seroquel and PRN for insomnia. melatonin started.   -c/w home regimen

## 2020-01-22 NOTE — BEHAVIORAL HEALTH ASSESSMENT NOTE - NSBHCHARTREVIEWLAB_PSY_A_CORE FT
11.5   6.72  )-----------( 371      ( 2020 06:00 )             34.6     01-    138  |  105  |  13  ----------------------------<  108<H>  3.6   |  22  |  0.68    Ca    9.1      2020 06:00  Phos  2.4       Mg     1.9         TPro  6.8  /  Alb  3.7  /  TBili  0.5  /  DBili  x   /  AST  21  /  ALT  23  /  AlkPhos  57      Urinalysis Basic - ( 2020 15:10 )    Color: YELLOW / Appearance: CLEAR / S.015 / pH: 6.0  Gluc: NEGATIVE / Ketone: NEGATIVE  / Bili: NEGATIVE / Urobili: NORMAL   Blood: NEGATIVE / Protein: 20 / Nitrite: NEGATIVE   Leuk Esterase: NEGATIVE / RBC: 3-5 / WBC 0-2   Sq Epi: OCC / Non Sq Epi: x / Bacteria: NEGATIVE      LIVER FUNCTIONS - ( 2020 15:30 )  Alb: 3.7 g/dL / Pro: 6.8 g/dL / ALK PHOS: 57 u/L / ALT: 23 u/L / AST: 21 u/L / GGT: x           Thyroid Stimulating Hormone, Serum (20 @ 15:30)    Thyroid Stimulating Hormone, Serum: 1.94 uIU/mL

## 2020-01-22 NOTE — BEHAVIORAL HEALTH ASSESSMENT NOTE - SUICIDE PROTECTIVE FACTORS
Identifies reasons for living/Has future plans/Engaged in work or school/Responsibility to family and others

## 2020-01-22 NOTE — PROGRESS NOTE ADULT - SUBJECTIVE AND OBJECTIVE BOX
Patient is a 54y old  Male who presents with a chief complaint of Chest pain (2020 10:05)      SUBJECTIVE / OVERNIGHT EVENTS:  brief syncopal event pre-stress test, postponed.  tele only with PVC occasionally  anxious at times.  "please leave me alone, I really need to take a rest/nap"  no cp, no sob, no n/v/d. no abdominal pain.  no headache, no dizziness.       Vital Signs Last 24 Hrs  T(C): 36.6 (2020 15:40), Max: 37 (2020 01:47)  T(F): 97.9 (2020 15:40), Max: 98.6 (2020 01:47)  HR: 85 (2020 15:40) (73 - 90)  BP: 149/91 (2020 15:40) (106/61 - 149/91)  BP(mean): --  RR: 19 (2020 15:40) (14 - 19)  SpO2: 97% (2020 15:40) (97% - 100%)  I&O's Summary      PHYSICAL EXAM:  GENERAL: NAD, Comfortable  HEAD:  Atraumatic, Normocephalic  EYES: EOMI, PERRLA, conjunctiva and sclera clear  NECK: Supple, No JVD  CHEST/LUNG: Clear to auscultation bilaterally; No wheeze  HEART: Regular rate and rhythm; No murmurs, rubs, or gallops  ABDOMEN: Soft, Nontender, Nondistended; Bowel sounds present  Neuro: AAO x 3, no focal deficit, 5/5 b/l extremities  EXTREMITIES:  2+ Peripheral Pulses, No clubbing, cyanosis, or edema  SKIN: No rashes or lesions    LABS:                        11.5   6.72  )-----------( 371      ( 2020 06:00 )             34.6     -    138  |  105  |  13  ----------------------------<  108<H>  3.6   |  22  |  0.68    Ca    9.1      2020 06:00  Phos  2.4       Mg     1.9         TPro  6.8  /  Alb  3.7  /  TBili  0.5  /  DBili  x   /  AST  21  /  ALT  23  /  AlkPhos  57        CAPILLARY BLOOD GLUCOSE      POCT Blood Glucose.: 147 mg/dL (2020 14:45)    CARDIAC MARKERS ( 2020 15:30 )  x     / x     / 128 u/L / x     / x          Urinalysis Basic - ( 2020 15:10 )    Color: YELLOW / Appearance: CLEAR / S.015 / pH: 6.0  Gluc: NEGATIVE / Ketone: NEGATIVE  / Bili: NEGATIVE / Urobili: NORMAL   Blood: NEGATIVE / Protein: 20 / Nitrite: NEGATIVE   Leuk Esterase: NEGATIVE / RBC: 3-5 / WBC 0-2   Sq Epi: OCC / Non Sq Epi: x / Bacteria: NEGATIVE        RADIOLOGY & ADDITIONAL TESTS:    Imaging Personally Reviewed:  [x] YES  [ ] NO    Consultant(s) Notes Reviewed:  [x] YES  [ ] NO      MEDICATIONS  (STANDING):  amLODIPine   Tablet 5 milliGRAM(s) Oral daily  aspirin enteric coated 81 milliGRAM(s) Oral daily  busPIRone 5 milliGRAM(s) Oral two times a day  escitalopram 10 milliGRAM(s) Oral daily  losartan 50 milliGRAM(s) Oral daily  melatonin 6 milliGRAM(s) Oral at bedtime  nicotine - 21 mG/24Hr(s) Patch 1 patch Transdermal daily  QUEtiapine 25 milliGRAM(s) Oral at bedtime    MEDICATIONS  (PRN):  QUEtiapine 25 milliGRAM(s) Oral once PRN insomnia  QUEtiapine 25 milliGRAM(s) Oral every 6 hours PRN anxiety      Care Discussed with Consultants/Other Providers [x] YES  [ ] NO    HEALTH ISSUES - PROBLEM Dx:  Benzodiazepine dependence in remission  Anxiety disorder, unspecified type: Anxiety disorder, unspecified type  Dehydration: Dehydration  Discharge planning issues: Discharge planning issues  Need for prophylactic measure: Need for prophylactic measure  Essential hypertension: Essential hypertension  Hypokalemia: Hypokalemia  Hyponatremia: Hyponatremia  Anxiety: Anxiety  Chest pain, unspecified type: Chest pain, unspecified type

## 2020-01-22 NOTE — CONSULT NOTE ADULT - ASSESSMENT
53yo Male, with FHx of MI (father at the age of 65), MHx of anxiety, HTN, self-reported h/o Xanax dependence; a/w CP and severe anxiety.     1. AtypIcal Chest pain  in the setting of severe anxiety disorder  HST neg x 2,CK normal, EKG with no acute ischemia  cxr clear, no evidence of ADHF on exam  check echo to eval LVEF, valve function     2. HTN  bp stable   continue current med regimen     3. anxiety  psych, med f/u     dvt ppx 55yo Male, with Family history of MI in father (first MI in 50s s/p stents, then  at 65 2/2 to MI), Hx of anxiety, HTN, self-reported h/o Xanax dependence; a/w CP and severe anxiety.     1. AtypIcal Chest pain  in the setting of severe anxiety disorder  HST neg x 2,CK normal, EKG with no acute ischemia  as recent stress test/echo within past year normal   cxr clear, no evidence of ADHF on exam  check echo to eval LVEF, valve function   would start asa 81mg daily as ppx given strong family hx CAD     2. HTN  bp stable   continue current med regimen     3. severe anxiety  psych, med f/u     dvt ppx 53yo Male, with Family history of MI in father (first MI in 50s s/p stents, then  at 65 2/2 to MI), Hx of anxiety, HTN, self-reported h/o Xanax dependence; a/w CP and severe anxiety.     1. AtypIcal Chest pain  in the setting of severe anxiety disorder  HST neg x 2,CK normal, EKG with no acute ischemia  cxr clear, no evidence of ADHF on exam  check echo to eval LVEF, valve function   given family history, plan for stress test to r/o ischemia   start asa 81mg daily as ppx given strong family hx CAD     2. HTN  bp stable   continue current med regimen     3. severe anxiety  psych, med f/u     dvt ppx

## 2020-01-22 NOTE — CONSULT NOTE ADULT - ATTENDING COMMENTS
agree with the above assessment and plan by BRETT Escalera.  55yo Male, with Family history of MI in father (first MI in 50s s/p stents, then  at 65 2/2 to MI), Hx of anxiety, HTN, self-reported h/o Xanax dependence; a/w CP in setting of anxiety  symptoms may be related to underlying anxiety  given his risk factors an ischemic eval is reasonable  recommend exercise nuclear stress

## 2020-01-22 NOTE — PATIENT PROFILE ADULT - FUNCTIONAL SCREEN CURRENT LEVEL: DRESSING, MLM
Other (Free Text): Recommended to see Aesthetcian for possible chemical peels, laser ect.
Other (Free Text): Referred to  for consult.  Would benefit from IPL or series of chemical peels.
Note Text (......Xxx Chief Complaint.): This diagnosis correlates with the
Detail Level: Detailed
0 = independent

## 2020-01-22 NOTE — PROGRESS NOTE ADULT - ASSESSMENT
55 yo Male, with FHx of MI (father at the age of 65), MHx of anxiety, HTN, self-reported h/o Xanax dependence; a/w CP and severe anxiety.

## 2020-01-23 PROCEDURE — 78452 HT MUSCLE IMAGE SPECT MULT: CPT | Mod: 26

## 2020-01-23 PROCEDURE — 93018 CV STRESS TEST I&R ONLY: CPT | Mod: GC

## 2020-01-23 PROCEDURE — 93016 CV STRESS TEST SUPVJ ONLY: CPT | Mod: GC

## 2020-01-23 RX ADMIN — QUETIAPINE FUMARATE 25 MILLIGRAM(S): 200 TABLET, FILM COATED ORAL at 13:53

## 2020-01-23 RX ADMIN — QUETIAPINE FUMARATE 25 MILLIGRAM(S): 200 TABLET, FILM COATED ORAL at 19:53

## 2020-01-23 RX ADMIN — AMLODIPINE BESYLATE 5 MILLIGRAM(S): 2.5 TABLET ORAL at 05:11

## 2020-01-23 RX ADMIN — Medication 5 MILLIGRAM(S): at 05:11

## 2020-01-23 RX ADMIN — Medication 1 PATCH: at 19:54

## 2020-01-23 RX ADMIN — Medication 1 MILLIGRAM(S): at 10:11

## 2020-01-23 RX ADMIN — Medication 6 MILLIGRAM(S): at 22:02

## 2020-01-23 RX ADMIN — QUETIAPINE FUMARATE 25 MILLIGRAM(S): 200 TABLET, FILM COATED ORAL at 22:02

## 2020-01-23 RX ADMIN — LOSARTAN POTASSIUM 50 MILLIGRAM(S): 100 TABLET, FILM COATED ORAL at 05:11

## 2020-01-23 RX ADMIN — Medication 1 PATCH: at 07:57

## 2020-01-23 RX ADMIN — QUETIAPINE FUMARATE 25 MILLIGRAM(S): 200 TABLET, FILM COATED ORAL at 02:42

## 2020-01-23 RX ADMIN — QUETIAPINE FUMARATE 25 MILLIGRAM(S): 200 TABLET, FILM COATED ORAL at 05:11

## 2020-01-23 RX ADMIN — Medication 1 PATCH: at 11:25

## 2020-01-23 RX ADMIN — Medication 5 MILLIGRAM(S): at 18:52

## 2020-01-23 RX ADMIN — ESCITALOPRAM OXALATE 10 MILLIGRAM(S): 10 TABLET, FILM COATED ORAL at 11:25

## 2020-01-23 RX ADMIN — Medication 81 MILLIGRAM(S): at 11:25

## 2020-01-23 NOTE — CHART NOTE - NSCHARTNOTEFT_GEN_A_CORE
PT was brought down to stress and became very anxious. Stated also had argument on phone with family members. Stress stated pt became very anxious and ran out of stress lab complaining of intense anxiety. Spoke w/Dr Mykel Sherman to give pt Ativan 1mg po and then will go back to stress. Spoke with pt who states he feels much better. No longer feeling anxious. No chest pain, no SOB, no anxiety. PT to be sent to stress lab now.

## 2020-01-23 NOTE — PROGRESS NOTE ADULT - SUBJECTIVE AND OBJECTIVE BOX
Patient is a 54y old  Male who presents with a chief complaint of Chest pain (2020 19:06)      SUBJECTIVE / OVERNIGHT EVENTS:  went for stress test but canceled this am since he got too anxious  sent back to the floor.  to received anti-anxiety med and reattempt.   feels "embarassed"  no cp, no sob, no n/v/d. no abdominal pain.  no headache, no dizziness.   wants to go back for the test today         Vital Signs Last 24 Hrs  T(C): 37.1 (2020 05:09), Max: 37.2 (2020 20:12)  T(F): 98.7 (2020 05:09), Max: 98.9 (2020 20:12)  HR: 103 (2020 05:09) (85 - 103)  BP: 163/87 (2020 05:09) (147/81 - 163/87)  BP(mean): --  RR: 18 (2020 05:09) (18 - 19)  SpO2: 100% (2020 05:09) (96% - 100%)  I&O's Summary      PHYSICAL EXAM:  GENERAL: NAD, Comfortable, awake alert, sitting up  HEAD:  Atraumatic, Normocephalic  EYES: EOMI, PERRLA, conjunctiva and sclera clear  NECK: Supple, No JVD  CHEST/LUNG: Clear to auscultation bilaterally; No wheeze  HEART: Regular rate and rhythm; No murmurs, rubs, or gallops  ABDOMEN: Soft, Nontender, Nondistended; Bowel sounds present  Neuro: AAO x 3, no focal deficit, 5/5 b/l extremities  EXTREMITIES:  2+ Peripheral Pulses, No clubbing, cyanosis, or edema  SKIN: No rashes or lesions      LABS:                        11.5   6.72  )-----------( 371      ( 2020 06:00 )             34.6     -    138  |  105  |  13  ----------------------------<  108<H>  3.6   |  22  |  0.68    Ca    9.1      2020 06:00  Phos  2.4     -  Mg     1.9         TPro  6.8  /  Alb  3.7  /  TBili  0.5  /  DBili  x   /  AST  21  /  ALT  23  /  AlkPhos  57        CAPILLARY BLOOD GLUCOSE      POCT Blood Glucose.: 147 mg/dL (2020 14:45)    CARDIAC MARKERS ( 2020 15:30 )  x     / x     / 128 u/L / x     / x          Urinalysis Basic - ( 2020 15:10 )    Color: YELLOW / Appearance: CLEAR / S.015 / pH: 6.0  Gluc: NEGATIVE / Ketone: NEGATIVE  / Bili: NEGATIVE / Urobili: NORMAL   Blood: NEGATIVE / Protein: 20 / Nitrite: NEGATIVE   Leuk Esterase: NEGATIVE / RBC: 3-5 / WBC 0-2   Sq Epi: OCC / Non Sq Epi: x / Bacteria: NEGATIVE        RADIOLOGY & ADDITIONAL TESTS:    Imaging Personally Reviewed:  [x] YES  [ ] NO    Consultant(s) Notes Reviewed:  [x] YES  [ ] NO      MEDICATIONS  (STANDING):  amLODIPine   Tablet 5 milliGRAM(s) Oral daily  aspirin enteric coated 81 milliGRAM(s) Oral daily  busPIRone 5 milliGRAM(s) Oral two times a day  escitalopram 10 milliGRAM(s) Oral daily  losartan 50 milliGRAM(s) Oral daily  melatonin 6 milliGRAM(s) Oral at bedtime  nicotine - 21 mG/24Hr(s) Patch 1 patch Transdermal daily  QUEtiapine 25 milliGRAM(s) Oral at bedtime    MEDICATIONS  (PRN):  QUEtiapine 25 milliGRAM(s) Oral every 6 hours PRN anxiety      Care Discussed with Consultants/Other Providers [x] YES  [ ] NO    HEALTH ISSUES - PROBLEM Dx:  Benzodiazepine dependence in remission  Anxiety disorder, unspecified type: Anxiety disorder, unspecified type  Dehydration: Dehydration  Discharge planning issues: Discharge planning issues  Need for prophylactic measure: Need for prophylactic measure  Essential hypertension: Essential hypertension  Hypokalemia: Hypokalemia  Hyponatremia: Hyponatremia  Anxiety: Anxiety  Chest pain, unspecified type: Chest pain, unspecified type

## 2020-01-23 NOTE — PROGRESS NOTE ADULT - ASSESSMENT
53 yo Male, with FHx of MI (father at the age of 65), MHx of anxiety, HTN, self-reported h/o Xanax dependence; a/w CP and severe anxiety.

## 2020-01-23 NOTE — PROGRESS NOTE ADULT - PROBLEM SELECTOR PLAN 1
Cardiac vs psychiatric etiology due to anxiety vs panic ?attack;  Former smoker, FHx of MI in father, age 65; Low suspicion of ACS at this time;   -Telemetry  -Trend CE  -Cardiology c/s appreciated. outpt card Dr. Dean Mitchell  -EKG: no acute Tw or ST changes, repeat PRN CP  -TTE  -TSH wnl  -CXR: not contributory   - NM stress test given family history (plan for ativan x 1 pre-stress test for severe anxiety)

## 2020-01-23 NOTE — PROGRESS NOTE ADULT - SUBJECTIVE AND OBJECTIVE BOX
CARDIOLOGY FOLLOW UP NOTE - DR. ARREDONDO    Subjective:    no chest pain, sob, palpitations    PHYSICAL EXAM:  T(C): 37.1 (01-23-20 @ 05:09), Max: 37.2 (01-22-20 @ 20:12)  HR: 103 (01-23-20 @ 05:09) (92 - 103)  BP: 163/87 (01-23-20 @ 05:09) (147/81 - 163/87)  RR: 18 (01-23-20 @ 05:09) (18 - 18)  SpO2: 100% (01-23-20 @ 05:09) (96% - 100%)  Wt(kg): --  I&O's Summary    Daily Height in cm: 175.26 (22 Jan 2020 21:26)    Daily     Appearance: Normal	  Cardiovascular: Normal S1 S2,RRR, No JVD, No murmurs  Respiratory: Lungs clear to auscultation	  Gastrointestinal:  Soft, Non-tender, + BS	  Extremities: Normal range of motion, No clubbing, cyanosis or edema      Home Medications:  amLODIPine 5 mg oral tablet: 1 tab(s) orally once a day (21 Jan 2020 20:46)  BuSpar 5 mg oral tablet: 1 tab(s) orally 2 times a day (21 Jan 2020 20:45)  Lexapro 10 mg oral tablet: 1 tab(s) orally once a day (21 Jan 2020 20:45)  losartan 50 mg oral tablet: 1 tab(s) orally once a day (21 Jan 2020 20:45)      MEDICATIONS  (STANDING):  amLODIPine   Tablet 5 milliGRAM(s) Oral daily  aspirin enteric coated 81 milliGRAM(s) Oral daily  busPIRone 5 milliGRAM(s) Oral two times a day  escitalopram 10 milliGRAM(s) Oral daily  losartan 50 milliGRAM(s) Oral daily  melatonin 6 milliGRAM(s) Oral at bedtime  nicotine - 21 mG/24Hr(s) Patch 1 patch Transdermal daily  QUEtiapine 25 milliGRAM(s) Oral at bedtime      TELEMETRY: 	    ECG:  	  RADIOLOGY:   DIAGNOSTIC TESTING:  [ ] Echocardiogram:  [ ] Catheterization:  [ ] Stress Test:    OTHER: 	    LABS:	 	    CARDIAC MARKERS:                                11.5   6.72  )-----------( 371      ( 22 Jan 2020 06:00 )             34.6     01-22    138  |  105  |  13  ----------------------------<  108<H>  3.6   |  22  |  0.68    Ca    9.1      22 Jan 2020 06:00  Phos  2.4     01-22  Mg     1.9     01-22      proBNP:     Lipid Profile:   HgA1c:     Creatinine, Serum: 0.68 mg/dL (01-22-20 @ 06:00)  Creatinine, Serum: 0.66 mg/dL (01-21-20 @ 15:30)

## 2020-01-23 NOTE — PROGRESS NOTE ADULT - ASSESSMENT
55yo Male, with Family history of MI in father (first MI in 50s s/p stents, then  at 65 2/2 to MI), Hx of anxiety, HTN, self-reported h/o Xanax dependence; a/w CP and severe anxiety.     1. Atypical Chest pain  -in the setting of severe anxiety disorder  -HST neg x 2,CK normal, EKG with no acute ischemia  -cxr clear, no evidence of ADHF on exam  -await echo to eval LVEF, valve function   -f/u stress results   -cont asa 81mg daily as ppx given strong family hx CAD     2. HTN  -bp stable   -continue current med regimen     3. severe anxiety  -psych, med f/u     dvt ppx

## 2020-01-23 NOTE — PROGRESS NOTE ADULT - PROBLEM SELECTOR PLAN 4
Sodium 133; On Lexapro, low dose;  -Monitor closely as on SSRI, may need to hold if sodium trends down

## 2020-01-24 ENCOUNTER — TRANSCRIPTION ENCOUNTER (OUTPATIENT)
Age: 55
End: 2020-01-24

## 2020-01-24 VITALS
HEART RATE: 95 BPM | SYSTOLIC BLOOD PRESSURE: 152 MMHG | TEMPERATURE: 98 F | RESPIRATION RATE: 16 BRPM | OXYGEN SATURATION: 100 % | DIASTOLIC BLOOD PRESSURE: 97 MMHG

## 2020-01-24 LAB
ANION GAP SERPL CALC-SCNC: 14 MMO/L — SIGNIFICANT CHANGE UP (ref 7–14)
BUN SERPL-MCNC: 21 MG/DL — SIGNIFICANT CHANGE UP (ref 7–23)
CALCIUM SERPL-MCNC: 9.4 MG/DL — SIGNIFICANT CHANGE UP (ref 8.4–10.5)
CHLORIDE SERPL-SCNC: 101 MMOL/L — SIGNIFICANT CHANGE UP (ref 98–107)
CO2 SERPL-SCNC: 22 MMOL/L — SIGNIFICANT CHANGE UP (ref 22–31)
CREAT SERPL-MCNC: 0.54 MG/DL — SIGNIFICANT CHANGE UP (ref 0.5–1.3)
GLUCOSE SERPL-MCNC: 117 MG/DL — HIGH (ref 70–99)
HCT VFR BLD CALC: 37.3 % — LOW (ref 39–50)
HGB BLD-MCNC: 12.3 G/DL — LOW (ref 13–17)
MAGNESIUM SERPL-MCNC: 1.5 MG/DL — LOW (ref 1.6–2.6)
MCHC RBC-ENTMCNC: 31.1 PG — SIGNIFICANT CHANGE UP (ref 27–34)
MCHC RBC-ENTMCNC: 33 % — SIGNIFICANT CHANGE UP (ref 32–36)
MCV RBC AUTO: 94.2 FL — SIGNIFICANT CHANGE UP (ref 80–100)
NRBC # FLD: 0 K/UL — SIGNIFICANT CHANGE UP (ref 0–0)
PLATELET # BLD AUTO: 436 K/UL — HIGH (ref 150–400)
PMV BLD: 9.1 FL — SIGNIFICANT CHANGE UP (ref 7–13)
POTASSIUM SERPL-MCNC: 3.3 MMOL/L — LOW (ref 3.5–5.3)
POTASSIUM SERPL-SCNC: 3.3 MMOL/L — LOW (ref 3.5–5.3)
RBC # BLD: 3.96 M/UL — LOW (ref 4.2–5.8)
RBC # FLD: 13.9 % — SIGNIFICANT CHANGE UP (ref 10.3–14.5)
SODIUM SERPL-SCNC: 137 MMOL/L — SIGNIFICANT CHANGE UP (ref 135–145)
WBC # BLD: 6.36 K/UL — SIGNIFICANT CHANGE UP (ref 3.8–10.5)
WBC # FLD AUTO: 6.36 K/UL — SIGNIFICANT CHANGE UP (ref 3.8–10.5)

## 2020-01-24 PROCEDURE — 99232 SBSQ HOSP IP/OBS MODERATE 35: CPT

## 2020-01-24 RX ORDER — ASPIRIN/CALCIUM CARB/MAGNESIUM 324 MG
1 TABLET ORAL
Qty: 30 | Refills: 0
Start: 2020-01-24 | End: 2020-02-22

## 2020-01-24 RX ORDER — MAGNESIUM SULFATE 500 MG/ML
2 VIAL (ML) INJECTION ONCE
Refills: 0 | Status: COMPLETED | OUTPATIENT
Start: 2020-01-24 | End: 2020-01-24

## 2020-01-24 RX ORDER — POTASSIUM CHLORIDE 20 MEQ
20 PACKET (EA) ORAL ONCE
Refills: 0 | Status: COMPLETED | OUTPATIENT
Start: 2020-01-24 | End: 2020-01-24

## 2020-01-24 RX ORDER — ASPIRIN/CALCIUM CARB/MAGNESIUM 324 MG
1 TABLET ORAL
Qty: 0 | Refills: 0 | DISCHARGE
Start: 2020-01-24

## 2020-01-24 RX ORDER — QUETIAPINE FUMARATE 200 MG/1
1 TABLET, FILM COATED ORAL
Qty: 30 | Refills: 0
Start: 2020-01-24 | End: 2020-02-22

## 2020-01-24 RX ORDER — AMLODIPINE BESYLATE 2.5 MG/1
10 TABLET ORAL DAILY
Refills: 0 | Status: DISCONTINUED | OUTPATIENT
Start: 2020-01-25 | End: 2020-01-24

## 2020-01-24 RX ORDER — AMLODIPINE BESYLATE 2.5 MG/1
5 TABLET ORAL ONCE
Refills: 0 | Status: COMPLETED | OUTPATIENT
Start: 2020-01-24 | End: 2020-01-24

## 2020-01-24 RX ORDER — HYDROXYZINE HCL 10 MG
25 TABLET ORAL ONCE
Refills: 0 | Status: DISCONTINUED | OUTPATIENT
Start: 2020-01-24 | End: 2020-01-24

## 2020-01-24 RX ORDER — LANOLIN ALCOHOL/MO/W.PET/CERES
2 CREAM (GRAM) TOPICAL
Qty: 0 | Refills: 0 | DISCHARGE
Start: 2020-01-24

## 2020-01-24 RX ORDER — QUETIAPINE FUMARATE 200 MG/1
1 TABLET, FILM COATED ORAL
Qty: 60 | Refills: 0
Start: 2020-01-24 | End: 2020-02-22

## 2020-01-24 RX ORDER — AMLODIPINE BESYLATE 2.5 MG/1
1 TABLET ORAL
Qty: 30 | Refills: 0
Start: 2020-01-24 | End: 2020-02-22

## 2020-01-24 RX ORDER — AMLODIPINE BESYLATE 2.5 MG/1
1 TABLET ORAL
Qty: 0 | Refills: 0 | DISCHARGE

## 2020-01-24 RX ORDER — QUETIAPINE FUMARATE 200 MG/1
25 TABLET, FILM COATED ORAL ONCE
Refills: 0 | Status: COMPLETED | OUTPATIENT
Start: 2020-01-24 | End: 2020-01-24

## 2020-01-24 RX ADMIN — Medication 1 PATCH: at 11:20

## 2020-01-24 RX ADMIN — Medication 1 PATCH: at 07:17

## 2020-01-24 RX ADMIN — Medication 1 PATCH: at 11:21

## 2020-01-24 RX ADMIN — Medication 5 MILLIGRAM(S): at 06:01

## 2020-01-24 RX ADMIN — QUETIAPINE FUMARATE 25 MILLIGRAM(S): 200 TABLET, FILM COATED ORAL at 11:21

## 2020-01-24 RX ADMIN — QUETIAPINE FUMARATE 25 MILLIGRAM(S): 200 TABLET, FILM COATED ORAL at 07:16

## 2020-01-24 RX ADMIN — LOSARTAN POTASSIUM 50 MILLIGRAM(S): 100 TABLET, FILM COATED ORAL at 06:01

## 2020-01-24 RX ADMIN — AMLODIPINE BESYLATE 5 MILLIGRAM(S): 2.5 TABLET ORAL at 11:59

## 2020-01-24 RX ADMIN — ESCITALOPRAM OXALATE 10 MILLIGRAM(S): 10 TABLET, FILM COATED ORAL at 11:20

## 2020-01-24 RX ADMIN — QUETIAPINE FUMARATE 25 MILLIGRAM(S): 200 TABLET, FILM COATED ORAL at 02:00

## 2020-01-24 RX ADMIN — AMLODIPINE BESYLATE 5 MILLIGRAM(S): 2.5 TABLET ORAL at 06:01

## 2020-01-24 RX ADMIN — Medication 50 GRAM(S): at 10:14

## 2020-01-24 RX ADMIN — Medication 81 MILLIGRAM(S): at 11:20

## 2020-01-24 RX ADMIN — Medication 20 MILLIEQUIVALENT(S): at 10:14

## 2020-01-24 NOTE — DISCHARGE NOTE NURSING/CASE MANAGEMENT/SOCIAL WORK - PATIENT PORTAL LINK FT
You can access the FollowMyHealth Patient Portal offered by [facility] by registering at the following website: [email or web address]. By joining [facility]’s FollowMyHealth portal, you will also be able to view your health information using other applications (apps) compatible with our system.

## 2020-01-24 NOTE — PROGRESS NOTE ADULT - SUBJECTIVE AND OBJECTIVE BOX
CARDIOLOGY FOLLOW UP - Dr. Leach    CC no cp/sob   events noted, elevated bp 2/2 to anxiety     PHYSICAL EXAM:  T(C): 36.6 (01-24-20 @ 06:00), Max: 37.1 (01-23-20 @ 18:51)  HR: 92 (01-24-20 @ 06:00) (89 - 95)  BP: 187/117 (01-24-20 @ 06:00) (151/96 - 187/117)  RR: 18 (01-24-20 @ 06:00) (18 - 18)  SpO2: 100% (01-24-20 @ 06:00) (99% - 100%)  Wt(kg): --  I&O's Summary    24 Jan 2020 07:01  -  24 Jan 2020 10:28  --------------------------------------------------------  IN: 100 mL / OUT: 0 mL / NET: 100 mL        Appearance: Normal	  Cardiovascular: Normal S1 S2,RRR, No JVD, No murmurs  Respiratory: Lungs clear to auscultation	  Gastrointestinal:  Soft, Non-tender, + BS	  Extremities: Normal range of motion, No clubbing, cyanosis or edema        MEDICATIONS  (STANDING):  amLODIPine   Tablet 5 milliGRAM(s) Oral daily  aspirin enteric coated 81 milliGRAM(s) Oral daily  busPIRone 5 milliGRAM(s) Oral two times a day  escitalopram 10 milliGRAM(s) Oral daily  hydrOXYzine hydrochloride 25 milliGRAM(s) Oral once  losartan 50 milliGRAM(s) Oral daily  melatonin 6 milliGRAM(s) Oral at bedtime  nicotine - 21 mG/24Hr(s) Patch 1 patch Transdermal daily  QUEtiapine 25 milliGRAM(s) Oral at bedtime      TELEMETRY: NSR     ECG:  	  RADIOLOGY:   DIAGNOSTIC TESTING:  [ ] Echocardiogram: < from: Transthoracic Echocardiogram (01.22.20 @ 13:28) >  CONCLUSIONS:  1. Normal trileaflet aortic valve. Minimal aortic  regurgitation.  2. Normal left ventricular internal dimensions and wall  thicknesses.  3. Normal left ventricular systolic function. No segmental  wall motion abnormalities.  4. Normal right ventricular size and function.    < end of copied text >    [ ]  Catheterization:  [ ] Stress Test:  < from: Nuclear Stress Test-Pharmacologic (01.23.20 @ 13:02) >  IMPRESSIONS:Probably Normal Study  * Myocardial Perfusion SPECT results are probably normal.  * Review of raw data shows: The study is of fair technical  qualitywith significant adjacent bowel tracer uptake  * The left ventricle was normal in size. Normal myocardial  perfusion scan,with no evidence of infarction or inducible  ischemia.  * Post-stress gated wall motion analysis was performed  (LVEF = 50 %;LVEDV = 106 ml.), revealing normal LV  function. RV function appeared normal.    < end of copied text >    OTHER: 	    LABS:	 	                                12.3   6.36  )-----------( 436      ( 24 Jan 2020 06:00 )             37.3     01-24    137  |  101  |  21  ----------------------------<  117<H>  3.3<L>   |  22  |  0.54    Ca    9.4      24 Jan 2020 06:00  Mg     1.5     01-24

## 2020-01-24 NOTE — PROGRESS NOTE ADULT - PROBLEM SELECTOR PLAN 2
-Avoid Xanax as the pt was weaned off it recently   -Ativan x1 IV in ED  - will not discharge home on Benzos.   -Psych c/s appreciated, QHS Seroquel and PRN for insomnia. melatonin started.   -c/w home regimen

## 2020-01-24 NOTE — PROGRESS NOTE BEHAVIORAL HEALTH - NSBHCHARTREVIEWLAB_PSY_A_CORE FT
12.3   6.36  )-----------( 436      ( 24 Jan 2020 06:00 )             37.3     01-24    137  |  101  |  21  ----------------------------<  117<H>  3.3<L>   |  22  |  0.54    Ca    9.4      24 Jan 2020 06:00  Mg     1.5     01-24

## 2020-01-24 NOTE — PROGRESS NOTE BEHAVIORAL HEALTH - NSBHCONSULTMEDS_PSY_A_CORE FT
- increase qHS seroquel to 100mg qHS  - begin seroquel 25mg qAM + 25mg qNoon (with meals)  - c/w melatonin 6mg qHS standing  - holistic RN for relaxation therapties/techniques

## 2020-01-24 NOTE — PROVIDER CONTACT NOTE (OTHER) - SITUATION
Pt extremely anxious /117. AM BP meds given. Pt extremely anxious /117. AM BP meds given. Pt not due for seroquel at this time.

## 2020-01-24 NOTE — DISCHARGE NOTE PROVIDER - NSDCCPCAREPLAN_GEN_ALL_CORE_FT
PRINCIPAL DISCHARGE DIAGNOSIS  Diagnosis: Chest pain  Assessment and Plan of Treatment: You came to the hospital with chest pain. You were evaluated by cardiology. You were started on a baby aspirin (81mg) daily due to your family history of heart disease. Your echocardiogram and stress test were normal. Your chest pain was likely secondary to your anxiety.      SECONDARY DISCHARGE DIAGNOSES  Diagnosis: Essential hypertension  Assessment and Plan of Treatment: Continue your blood pressure medications as prescribed. Yout amlodipine was increased.  A low salt diet is recommended.    Diagnosis: Anxiety  Assessment and Plan of Treatment: Psychiatry saw you for your anxiety. You were started on Seroquel to help with your anxiety. Take as prescribed. Follow up with [facility]'s walk-in clinic

## 2020-01-24 NOTE — PROGRESS NOTE ADULT - ASSESSMENT
55yo Male, with Family history of MI in father (first MI in 50s s/p stents, then  at 65 2/2 to MI), Hx of anxiety, HTN, self-reported h/o Xanax dependence; a/w CP and severe anxiety.     1. Atypical Chest pain  -in the setting of severe anxiety disorder  -HST neg x 2,CK normal, EKG with no acute ischemia  -cxr clear, no evidence of ADHF on exam  -echo with normal LVEF, no significant valve disease     -stress test normal   -cont asa 81mg daily as ppx given strong family hx CAD     2. HTN  -bp elevated this am 2/2 to anxiety   -continue current med regimen, increase norvasc to 10mg daily if needed     3. severe anxiety  -psych, med f/u     dvt ppx

## 2020-01-24 NOTE — DISCHARGE NOTE NURSING/CASE MANAGEMENT/SOCIAL WORK - NSDCFUADDAPPT_GEN_ALL_CORE_FT
[facility] walk in clinic [phone] 9am -7p Monday    For long term care may consider: [facility] [phone]  [facility] [phone]

## 2020-01-24 NOTE — DISCHARGE NOTE PROVIDER - HOSPITAL COURSE
53yo Male, with FHx of MI (father at the age of 65), MHx of anxiety, HTN, self-reported h/o Xanax dependence admitted with chest pain and severe anxiety. Cardiology was consulted. Troponins negative and EKG non-ischemic. Patient had a TTE and NST that were unremarkable. Chest pain likely in the setting of severe anxiety. Psychiatry was consulted....        Case discussed with .. on.... The patient is medically stable for discharge and has appropriate follow up. 53yo Male, with FHx of MI (father at the age of 65), MHx of anxiety, HTN, self-reported h/o Xanax dependence admitted with chest pain and severe anxiety. Cardiology was consulted. Troponins negative and EKG non-ischemic. Patient had a TTE and NST that were unremarkable. Chest pain likely in the setting of severe anxiety. Psychiatry was consulted and patient meets criteria for generalized anxiety disordered. Started on seroquel and melatonin. No psychiatric contraindications to discharge, Patient can follow up at the [facility] crisis clinic.         Case discussed with Dr. Holbrook on 1/24/2020. The patient is medically stable for discharge and has appropriate follow up.

## 2020-01-24 NOTE — PROGRESS NOTE BEHAVIORAL HEALTH - NSBHCONSULTFOLLOWAFTERCARE_PSY_A_CORE FT
[facility] [phone] 9am -7p Monday    For long term care may consider: [facility] [phone]  [facility] [phone]  f/u with PMD at [facility]

## 2020-01-24 NOTE — PROGRESS NOTE ADULT - PROBLEM SELECTOR PLAN 1
Cardiac vs psychiatric etiology due to anxiety vs panic ?attack;  Former smoker, FHx of MI in father, age 65; Low suspicion of ACS at this time;   -Telemetry  -Trend CE  -Cardiology c/s appreciated. outpt card Dr. Dean Mitchell  -EKG: no acute Tw or ST changes, repeat PRN CP  -TTE normal LV.   -TSH wnl  -CXR: not contributory   - NM stress test given family history (plan for ativan x 1 pre-stress test for severe anxiety). NM stress test is negative

## 2020-01-24 NOTE — PROGRESS NOTE ADULT - ATTENDING COMMENTS
Agree with above NP note.  cv stable  stress/echo normal   sx likely secondary to anxiety  d/c planning
- Dr. OLGA Holbrook ([facility])  - [phone]
- Dr. OLGA Holbrook ([facility])  - [phone]
await stress test.     - Dr. OLGA Holbrook ([facility])  - [phone]

## 2020-01-24 NOTE — PROGRESS NOTE ADULT - SUBJECTIVE AND OBJECTIVE BOX
Patient is a 54y old  Male who presents with a chief complaint of Chest pain (24 Jan 2020 11:13)      SUBJECTIVE / OVERNIGHT EVENTS:  Feel okay.   complain of insomnia. "I haven't slept for a month"  No overnight event.  No complaints.  Chest pain free. no SOB, no N/V/D.  Denied HA/dizziness, abdominal pain.       Vital Signs Last 24 Hrs  T(C): 36.7 (24 Jan 2020 12:31), Max: 37.1 (23 Jan 2020 18:51)  T(F): 98 (24 Jan 2020 12:31), Max: 98.8 (23 Jan 2020 18:51)  HR: 95 (24 Jan 2020 12:31) (89 - 95)  BP: 152/97 (24 Jan 2020 12:31) (151/96 - 187/117)  BP(mean): --  RR: 16 (24 Jan 2020 12:31) (16 - 18)  SpO2: 100% (24 Jan 2020 12:31) (99% - 100%)  I&O's Summary    24 Jan 2020 07:01  -  24 Jan 2020 14:29  --------------------------------------------------------  IN: 100 mL / OUT: 0 mL / NET: 100 mL      PHYSICAL EXAM:  GENERAL: NAD, Comfortable  HEAD:  Atraumatic, Normocephalic  EYES: EOMI, PERRLA, conjunctiva and sclera clear  NECK: Supple, No JVD  CHEST/LUNG: Clear to auscultation bilaterally; No wheeze  HEART: Regular rate and rhythm; No murmurs, rubs, or gallops  ABDOMEN: Soft, Nontender, Nondistended; Bowel sounds present  Neuro: AAO x 3, no focal deficit, 5/5 b/l extremities  EXTREMITIES:  2+ Peripheral Pulses, No clubbing, cyanosis, or edema  SKIN: No rashes or lesions    LABS:                        12.3   6.36  )-----------( 436      ( 24 Jan 2020 06:00 )             37.3     01-24    137  |  101  |  21  ----------------------------<  117<H>  3.3<L>   |  22  |  0.54    Ca    9.4      24 Jan 2020 06:00  Mg     1.5     01-24        CAPILLARY BLOOD GLUCOSE                RADIOLOGY & ADDITIONAL TESTS:    Imaging Personally Reviewed:  [x] YES  [ ] NO    Consultant(s) Notes Reviewed:  [x] YES  [ ] NO      MEDICATIONS  (STANDING):  aspirin enteric coated 81 milliGRAM(s) Oral daily  busPIRone 5 milliGRAM(s) Oral two times a day  escitalopram 10 milliGRAM(s) Oral daily  hydrOXYzine hydrochloride 25 milliGRAM(s) Oral once  losartan 50 milliGRAM(s) Oral daily  melatonin 6 milliGRAM(s) Oral at bedtime  nicotine - 21 mG/24Hr(s) Patch 1 patch Transdermal daily  QUEtiapine 25 milliGRAM(s) Oral at bedtime    MEDICATIONS  (PRN):  QUEtiapine 25 milliGRAM(s) Oral every 6 hours PRN anxiety      Care Discussed with Consultants/Other Providers [x] YES  [ ] NO    HEALTH ISSUES - PROBLEM Dx:  Benzodiazepine dependence in remission  Anxiety disorder, unspecified type: Anxiety disorder, unspecified type  Dehydration: Dehydration  Discharge planning issues: Discharge planning issues  Need for prophylactic measure: Need for prophylactic measure  Essential hypertension: Essential hypertension  Hypokalemia: Hypokalemia  Hyponatremia: Hyponatremia  Anxiety: Anxiety  Chest pain, unspecified type: Chest pain, unspecified type

## 2020-01-24 NOTE — DISCHARGE NOTE PROVIDER - PROVIDER TOKENS
FREE:[LAST:[Dr. Yaya Gagnon ([facility])],PHONE:[(   )    -],FAX:[(   )    -],ESTABLISHEDPATIENT:[T]]

## 2020-01-24 NOTE — DISCHARGE NOTE PROVIDER - CARE PROVIDER_API CALL
Dr. Yaya Gagnon ([facility]),   Phone: (   )    -  Fax: (   )    -  Established Patient  Follow Up Time:

## 2020-01-24 NOTE — DISCHARGE NOTE PROVIDER - NSDCFUADDAPPT_GEN_ALL_CORE_FT
[facility] [phone] 9am -7p Monday    For long term care may consider: [facility] [phone]  [facility] [phone]

## 2020-01-24 NOTE — PROGRESS NOTE BEHAVIORAL HEALTH - NSBHCHARTREVIEWVS_PSY_A_CORE FT
Vital Signs Last 24 Hrs  T(C): 36.7 (24 Jan 2020 12:31), Max: 37.1 (23 Jan 2020 18:51)  T(F): 98 (24 Jan 2020 12:31), Max: 98.8 (23 Jan 2020 18:51)  HR: 95 (24 Jan 2020 12:31) (89 - 95)  BP: 152/97 (24 Jan 2020 12:31) (151/96 - 187/117)  BP(mean): --  RR: 16 (24 Jan 2020 12:31) (16 - 18)  SpO2: 100% (24 Jan 2020 12:31) (99% - 100%)

## 2020-01-24 NOTE — PROGRESS NOTE BEHAVIORAL HEALTH - NSBHCONSULTFOLLOWDETAILS_PSY_A_CORE FT
As patient wishes for immediate help with insomnia should f/u with [facility] crisis clinic as below, spoke to Crisis Center about appts who stated that it is best patient walks in first thing in the AM.    D/c to home on seroquel 25mg qAM + 25mg qAfternoon (with lunch) + 100mg q9pm  DO NOT discharge with benzodiazapine meds

## 2020-01-24 NOTE — PROGRESS NOTE BEHAVIORAL HEALTH - NSBHFUPINTERVALHXFT_PSY_A_CORE
Patient endorses high anxiety still, states he is sleeping only minimally, denies anhedonia wishes to get better, denies si/i/p or hi/i/p, wishes he could sleep more is amenable to higher dose of seroquel as below. Spoke to patient's wife who is amenable to dose increase, risks/benefits discussed. No safety concerns per patient or wife, wife thinks that the seroquel is helping but only partially. Denies decreased need for sleep, increased energy, racing thoughts, irritability euphoria or grandiosity. Denies AH/VH, delusions or paranoia..

## 2020-01-24 NOTE — PROGRESS NOTE BEHAVIORAL HEALTH - SUMMARY
55yo Male, PPH of generalized anxiety d/o, former benzo abuse now in remission, PMH of HTN, admitted for chest pain and cardiac workup. Psych consult for anxiety.    Patient meets criterion for generalized anxiety d/o, insomnia continues. Denies si/i/p or hi/i/p, has high anxiety about going home as he feels his insomnia has not remitted as it has been going on for >1mo, patient and wife amenable to higher dose of seroquel. Spoke to patient about inpt psych hospitalization which he and wife felt would worsen his anxiety, to which writer agreed as patient seems even more anxious in hospital setting than at home.

## 2020-01-27 ENCOUNTER — OUTPATIENT (OUTPATIENT)
Dept: OUTPATIENT SERVICES | Facility: HOSPITAL | Age: 55
LOS: 1 days | Discharge: HOME | End: 2020-01-27

## 2020-01-27 ENCOUNTER — TRANSCRIPTION ENCOUNTER (OUTPATIENT)
Age: 55
End: 2020-01-27

## 2020-01-27 PROBLEM — F41.9 ANXIETY DISORDER, UNSPECIFIED: Chronic | Status: ACTIVE | Noted: 2020-01-21

## 2020-01-27 PROBLEM — I10 ESSENTIAL (PRIMARY) HYPERTENSION: Chronic | Status: ACTIVE | Noted: 2020-01-21

## 2020-01-28 DIAGNOSIS — F19.10 OTHER PSYCHOACTIVE SUBSTANCE ABUSE, UNCOMPLICATED: ICD-10-CM

## 2020-01-28 DIAGNOSIS — F41.1 GENERALIZED ANXIETY DISORDER: ICD-10-CM

## 2020-03-10 ENCOUNTER — OUTPATIENT (OUTPATIENT)
Dept: OUTPATIENT SERVICES | Facility: HOSPITAL | Age: 55
LOS: 1 days | Discharge: HOME | End: 2020-03-10

## 2020-03-11 DIAGNOSIS — F11.10 OPIOID ABUSE, UNCOMPLICATED: ICD-10-CM

## 2021-02-15 NOTE — ED ADULT NURSE NOTE - NSSUHOSCREENINGYN_ED_ALL_ED
Addended by: ANDERSON LOPEZ on: 2/15/2021 08:42 AM     Modules accepted: Orders     Yes - the patient is able to be screened

## 2021-05-05 ENCOUNTER — TRANSCRIPTION ENCOUNTER (OUTPATIENT)
Age: 56
End: 2021-05-05

## 2021-11-29 NOTE — ED ADULT NURSE NOTE - ISOLATION TYPE:
Nutrition Assessment   Assessment Type: Follow up  Reason for Visit: Registered Dietitian Evaluation  Referral Requested By: Other: RD eval  Chart Medications Lab Results Reviewed: yes    Nutritional Risk Factors: PMH disordered eating    Current Diet Order: Regular, vegetarian  Diet Tolerance: tolerating  Food Allergies: no  Priority Points: Status L1    Demographic/Anthropometrics Information  Gender: female   Patient Age: 27 year old  Height: Height: 5' 4\" (162.6 cm)  Weight: Weight: 53.2 kg  BMI: Body mass index is 20.13 kg/m².    Usual Body Weight: n/a per EMR review    Physical Appearance: Underweight  Weight Classification: Normal weight (BMI 18.5-24.9)    Nutrition Diagnosis (PES)  No nutrition diagnosis at this time     Nutrition Plan  Recommended Nutrition Intervention: Coordination of nutrition care by a nutrition professional and Meals and snacks   Monitor: Biochemical data, medical tests, procedures, Food and beverage intake and Weight    Discharge Needs: Pending  Care Plan Discussed With: Patient  Goals: Meet >/= 75% of estimated needs  Goal Progress: Extended  Timeframe to Achieve Goal: Ongoing    Dietitian Notes/Impressions/Recommendations:  11/8/21: Pt presents with depression and suicidal ideation. PMH schizoaffective disorder, anorexia, nicotine dependence, suicide attempt, delirium, oppositional defiant disorder, and lyme disease per chat review. Pt assessed d/t history disordered eating and concern for recent wt loss-reported by pt mother per MD note. Secure chat with RN who reported unsure of pt PO intake since admission. Spoke with pt standing up at time of encounter- pt declined to speak with dietitian. Of note, pt with history of inpatient and outpatient treatment programs for anorexia nervosa with bulimia per chart review.   Weight Hx: n/a per EMR review  NFPE: unable to complete- visually assessed pt, no noted signs of muscle wasting    11/15/2021: Pt does not want to speak with RD. Spoke  with RN, who reports pt has a good appetite today. Reports she has been eating well and drinking Ensure regularly. No intake documented. No BM documented.    11/22/21: Secure chat with RN who reports PO intake continues to be good, no changes in appetite to report. Labs reviewed.     11/29/2021: Secure chat with RN who reports no changes. Pt continues to have good appetite and PO intake.     TREATMENT PLAN: Monitoring & Interventions   1. Continue regular diet as tolerated   2. Continue Ensure Enlive TID with meals   3. Monitor PO intake, wt, and labs     Malnutrition Status: pt does not meet criteria at this time          None

## 2022-01-23 NOTE — PROGRESS NOTE ADULT - PROBLEM SELECTOR PLAN 4
Goal Outcome Evaluation:  Plan of Care Reviewed With: sibling        Progress: declining  Outcome Summary: premedicated prior to turns with aivan 2mg, morphine 4mg, and robinul 0.8mg. Pt is unresponsive. Has appeared comfortable between care. Family at bedside, aware of pt conitnued decline. Will continue to monitor for comfort.   Sodium 133; On Lexapro, low dose;  -Monitor closely as on SSRI, may need to hold if sodium trends down

## 2022-10-17 ENCOUNTER — EMERGENCY (EMERGENCY)
Facility: HOSPITAL | Age: 57
LOS: 1 days | Discharge: HOME | End: 2022-10-17
Attending: EMERGENCY MEDICINE | Admitting: EMERGENCY MEDICINE

## 2022-10-17 VITALS
OXYGEN SATURATION: 98 % | HEART RATE: 91 BPM | SYSTOLIC BLOOD PRESSURE: 184 MMHG | RESPIRATION RATE: 18 BRPM | TEMPERATURE: 99 F | DIASTOLIC BLOOD PRESSURE: 106 MMHG

## 2022-10-17 VITALS
RESPIRATION RATE: 18 BRPM | SYSTOLIC BLOOD PRESSURE: 175 MMHG | DIASTOLIC BLOOD PRESSURE: 98 MMHG | HEART RATE: 72 BPM | OXYGEN SATURATION: 96 %

## 2022-10-17 LAB
ALBUMIN SERPL ELPH-MCNC: 3.6 G/DL — SIGNIFICANT CHANGE UP (ref 3.4–5)
ALP SERPL-CCNC: 66 U/L — SIGNIFICANT CHANGE UP (ref 40–120)
ALT FLD-CCNC: 20 U/L — SIGNIFICANT CHANGE UP (ref 12–42)
ANION GAP SERPL CALC-SCNC: 4 MMOL/L — LOW (ref 9–16)
AST SERPL-CCNC: 17 U/L — SIGNIFICANT CHANGE UP (ref 15–37)
BASOPHILS # BLD AUTO: 0.06 K/UL — SIGNIFICANT CHANGE UP (ref 0–0.2)
BASOPHILS NFR BLD AUTO: 0.9 % — SIGNIFICANT CHANGE UP (ref 0–2)
BILIRUB SERPL-MCNC: 0.5 MG/DL — SIGNIFICANT CHANGE UP (ref 0.2–1.2)
BUN SERPL-MCNC: 16 MG/DL — SIGNIFICANT CHANGE UP (ref 7–23)
CALCIUM SERPL-MCNC: 9.5 MG/DL — SIGNIFICANT CHANGE UP (ref 8.5–10.5)
CHLORIDE SERPL-SCNC: 105 MMOL/L — SIGNIFICANT CHANGE UP (ref 96–108)
CO2 SERPL-SCNC: 28 MMOL/L — SIGNIFICANT CHANGE UP (ref 22–31)
CREAT SERPL-MCNC: 0.85 MG/DL — SIGNIFICANT CHANGE UP (ref 0.5–1.3)
EGFR: 102 ML/MIN/1.73M2 — SIGNIFICANT CHANGE UP
EOSINOPHIL # BLD AUTO: 0.13 K/UL — SIGNIFICANT CHANGE UP (ref 0–0.5)
EOSINOPHIL NFR BLD AUTO: 2 % — SIGNIFICANT CHANGE UP (ref 0–6)
GLUCOSE SERPL-MCNC: 90 MG/DL — SIGNIFICANT CHANGE UP (ref 70–99)
HCT VFR BLD CALC: 39.4 % — SIGNIFICANT CHANGE UP (ref 39–50)
HGB BLD-MCNC: 13.2 G/DL — SIGNIFICANT CHANGE UP (ref 13–17)
IMM GRANULOCYTES NFR BLD AUTO: 0.2 % — SIGNIFICANT CHANGE UP (ref 0–0.9)
LIDOCAIN IGE QN: 37 U/L — LOW (ref 73–393)
LYMPHOCYTES # BLD AUTO: 1.47 K/UL — SIGNIFICANT CHANGE UP (ref 1–3.3)
LYMPHOCYTES # BLD AUTO: 22.4 % — SIGNIFICANT CHANGE UP (ref 13–44)
MAGNESIUM SERPL-MCNC: 1.8 MG/DL — SIGNIFICANT CHANGE UP (ref 1.6–2.6)
MCHC RBC-ENTMCNC: 30.4 PG — SIGNIFICANT CHANGE UP (ref 27–34)
MCHC RBC-ENTMCNC: 33.5 GM/DL — SIGNIFICANT CHANGE UP (ref 32–36)
MCV RBC AUTO: 90.8 FL — SIGNIFICANT CHANGE UP (ref 80–100)
MONOCYTES # BLD AUTO: 0.62 K/UL — SIGNIFICANT CHANGE UP (ref 0–0.9)
MONOCYTES NFR BLD AUTO: 9.4 % — SIGNIFICANT CHANGE UP (ref 2–14)
NEUTROPHILS # BLD AUTO: 4.28 K/UL — SIGNIFICANT CHANGE UP (ref 1.8–7.4)
NEUTROPHILS NFR BLD AUTO: 65.1 % — SIGNIFICANT CHANGE UP (ref 43–77)
NRBC # BLD: 0 /100 WBCS — SIGNIFICANT CHANGE UP (ref 0–0)
NT-PROBNP SERPL-SCNC: 140 PG/ML — SIGNIFICANT CHANGE UP
PLATELET # BLD AUTO: 264 K/UL — SIGNIFICANT CHANGE UP (ref 150–400)
POTASSIUM SERPL-MCNC: 4.3 MMOL/L — SIGNIFICANT CHANGE UP (ref 3.5–5.3)
POTASSIUM SERPL-SCNC: 4.3 MMOL/L — SIGNIFICANT CHANGE UP (ref 3.5–5.3)
PROT SERPL-MCNC: 7.1 G/DL — SIGNIFICANT CHANGE UP (ref 6.4–8.2)
RBC # BLD: 4.34 M/UL — SIGNIFICANT CHANGE UP (ref 4.2–5.8)
RBC # FLD: 13.7 % — SIGNIFICANT CHANGE UP (ref 10.3–14.5)
SARS-COV-2 RNA SPEC QL NAA+PROBE: SIGNIFICANT CHANGE UP
SODIUM SERPL-SCNC: 137 MMOL/L — SIGNIFICANT CHANGE UP (ref 132–145)
TROPONIN I, HIGH SENSITIVITY RESULT: 10.3 NG/L — SIGNIFICANT CHANGE UP
WBC # BLD: 6.57 K/UL — SIGNIFICANT CHANGE UP (ref 3.8–10.5)
WBC # FLD AUTO: 6.57 K/UL — SIGNIFICANT CHANGE UP (ref 3.8–10.5)

## 2022-10-17 PROCEDURE — 71045 X-RAY EXAM CHEST 1 VIEW: CPT | Mod: 26

## 2022-10-17 PROCEDURE — 93010 ELECTROCARDIOGRAM REPORT: CPT | Mod: NC

## 2022-10-17 PROCEDURE — 99285 EMERGENCY DEPT VISIT HI MDM: CPT | Mod: 25

## 2022-10-17 RX ORDER — SODIUM CHLORIDE 9 MG/ML
1000 INJECTION INTRAMUSCULAR; INTRAVENOUS; SUBCUTANEOUS ONCE
Refills: 0 | Status: COMPLETED | OUTPATIENT
Start: 2022-10-17 | End: 2022-10-17

## 2022-10-17 RX ADMIN — SODIUM CHLORIDE 2000 MILLILITER(S): 9 INJECTION INTRAMUSCULAR; INTRAVENOUS; SUBCUTANEOUS at 10:00

## 2022-10-17 RX ADMIN — SODIUM CHLORIDE 1000 MILLILITER(S): 9 INJECTION INTRAMUSCULAR; INTRAVENOUS; SUBCUTANEOUS at 10:53

## 2022-10-17 NOTE — ED PROVIDER NOTE - TEMPLATE
Adair is a 39 year old man presenting with 1 week history of right sided neck and shoulder discomfort. He has pain that shoots down from right side of neck to the right shoulder.   In February he was seen in urgent care with similar symptoms. He was referred to ortho and PT but his symptoms resolved and he did not pursue treatment.     He has no history of neck injury. There is no family history of neck or back problems.     Problem list reviewed today.     Medications reviewed today.     Allergies as of 08/11/2022   • (No Known Allergies)       Exam:  Well developed, well nourished man in no apparent distress.    Blood pressure 122/74, pulse 99, weight 77.6 kg (171 lb), SpO2 98 %.  Normal appearing neck.   Full range of motion of the neck. Pain aggravated by extension of neck and right schultz turning of head.   Full range of motion at the shoulders.   Deep tendon reflexes 2+/symmetric at biceps, brachioradialis.   5/5  strength, symmetric.     Cervical xray: unremarkable.     Impression:  Cervicalgia, radiculitis affecting right shoulder.   Symptoms occurred in February and now reoccurred.     Plan:  Prednisone x 7 days.   Pain management ordered.     Ryan Degroot MD      General

## 2022-10-17 NOTE — ED PROVIDER NOTE - GASTROINTESTINAL, MLM
Abdomen soft, non-tender, no guarding but old surgical scars noted. Abdomen soft, non-tender, no guarding, and old surgical scars noted.

## 2022-10-17 NOTE — ED ADULT NURSE NOTE - FINAL NURSING ELECTRONIC SIGNATURE
Radiologists confirmed results off hip x-ray which remain the same as the immediate care physicians results and patient was notified of these results at the time of visit.   17-Oct-2022 11:36

## 2022-10-17 NOTE — ED ADULT TRIAGE NOTE - CHIEF COMPLAINT QUOTE
pt BIBA for generalized malaise times two days, states that he feels as though he may pass out. states that he has not been compliant with his antihypertensive medications.

## 2022-10-17 NOTE — ED PROVIDER NOTE - CLINICAL SUMMARY MEDICAL DECISION MAKING FREE TEXT BOX
ED course: vitals signs are noted, patient is afebrile however noted to be hypertensive with blood pressure at 190/96 but rest of vital signs are within normal limits. Point of care glucose was normal at 87. Will do workup that includes cardiac enzymes, chest x-ray, IV fluids, PO doses, and then will reevaluate. ED course: vitals signs are noted, patient is afebrile however noted to be hypertensive with blood pressure at 190/96 but rest of vital signs are within normal limits. Point of care glucose was normal at 87. Will do workup that includes cardiac enzymes, chest x-ray. Will give IVF and will reevaluate.    Labs/studies noted and WNL. BP improved. Pt states he feels much better post IVF and requesting dc. Need for medication compliance stressed. Non-toxic appearing and stable for discharge. To follow up outpatient. Strict return precautions given.

## 2022-10-17 NOTE — ED PROVIDER NOTE - PSYCHIATRIC, MLM
Alert and oriented to person, place, time/situation. normal mood and affect. Normal mood and affect.

## 2022-10-17 NOTE — ED PROVIDER NOTE - NEUROLOGICAL, MLM
Patient got up and walked around normally. Gait is not ataxic. Alert and oriented, no focal deficits, no motor or sensory deficits. Patient got up and walked around normally. Gait is not ataxic. No focal deficits, no motor or sensory deficits. Patient got up and walked around normally. Gait is normal and not ataxic. No focal deficits, no motor or sensory deficits.

## 2022-10-17 NOTE — ED ADULT NURSE NOTE - OBJECTIVE STATEMENT
c/o generalized weakness and slight chest pain that began a couple of days ago. States he has not been taking BP medications. C/o near syncopal episode. Currently resting in stretcher.

## 2022-10-17 NOTE — ED PROVIDER NOTE - PATIENT PORTAL LINK FT
You can access the FollowMyHealth Patient Portal offered by [facility] by registering at the following website: [email or web address]. By joining [employer]’s FollowMyHealth portal, you will also be able to view your health information using other applications (apps) compatible with our system.

## 2022-10-17 NOTE — ED PROVIDER NOTE - CHPI ED SYMPTOMS NEG
no cough, no sore throat, no weakness, no numbness, no seizures, no HA, no diarrhea, no blood in stool./no fever/no chills

## 2022-10-17 NOTE — ED PROVIDER NOTE - NSFOLLOWUPCLINICS_GEN_ALL_ED_FT
[facility]  Family Medicine  [address]  [place], [place] [address]  Phone: [phone]  Fax:   Follow Up Time: 4-6 Days

## 2022-10-17 NOTE — ED PROVIDER NOTE - NSFOLLOWUPINSTRUCTIONS_ED_ALL_ED_FT
Please follow up with your primary care doctor in 2-3 days for better blood pressure control. Please take your blood pressure medication daily and do not skip any doses.  Return to the ER if you develop any concerning symptoms.     Hypertension    Hypertension, commonly called high blood pressure, is when the force of blood pumping through your arteries is too strong. Hypertension forces your heart to work harder to pump blood. Your arteries may become narrow or stiff. Having untreated or uncontrolled hypertension for a long period of time can cause heart attack, stroke, kidney disease, and other problems. If started on a medication, take exactly as prescribed by your health care professional. Maintain a healthy lifestyle and follow up with your primary care physician.    SEEK IMMEDIATE MEDICAL CARE IF YOU HAVE ANY OF THE FOLLOWING SYMPTOMS: severe headache, confusion, chest pain, abdominal pain, vomiting, or shortness of breath.      Weakness      Weakness is a lack of strength. You may feel weak all over your body (generalized), or you may feel weak in one part of your body (focal). There are many potential causes of weakness. Sometimes, the cause of your weakness may not be known. Some causes of weakness can be serious, so it is important to see your doctor.      Follow these instructions at home:    Activity     •Rest as needed.      •Try to get enough sleep. Most adults need 7–8 hours of sleep each night. Talk to your doctor about how much sleep you need each night.      •Do exercises, such as arm curls and leg raises, for 30 minutes at least 2 days a week or as told by your doctor.      •Think about working with a physical therapist or  to help you get stronger.        General instructions      •Take over-the-counter and prescription medicines only as told by your doctor.    •Eat a healthy, well-balanced diet. This includes:  •Proteins to build muscles, such as lean meats and fish.      •Fresh fruits and vegetables.      •Carbohydrates to boost energy, such as whole grains.        •Drink enough fluid to keep your pee (urine) pale yellow.      •Keep all follow-up visits as told by your doctor. This is important.        Contact a doctor if:    •Your weakness does not get better or it gets worse.    •Your weakness affects your ability to:  •Think clearly.      •Do your normal daily activities.          Get help right away if you:    •Have sudden weakness on one side of your face or body.      •Have chest pain.      •Have trouble breathing or shortness of breath.      •Have problems with your vision.      •Have trouble talking or swallowing.      •Have trouble standing or walking.      •Are light-headed.      •Pass out (lose consciousness).        Summary    •Weakness is a lack of strength. You may feel weak all over your body or just in one part of your body.      •There are many potential causes of weakness. Sometimes, the cause of your weakness may not be known.      •Rest as needed, and try to get enough sleep. Most adults need 7–8 hours of sleep each night.      •Eat a healthy, well-balanced diet.      This information is not intended to replace advice given to you by your health care provider. Make sure you discuss any questions you have with your health care provider.

## 2022-10-17 NOTE — ED PROVIDER NOTE - CONSTITUTIONAL, MLM
Well appearing, awake, alert, oriented to person, place, time/situation and in no apparent distress. normal... Well appearing, AO. Well appearing, AAO, NAD

## 2022-10-19 DIAGNOSIS — I10 ESSENTIAL (PRIMARY) HYPERTENSION: ICD-10-CM

## 2022-10-19 DIAGNOSIS — R42 DIZZINESS AND GIDDINESS: ICD-10-CM

## 2022-10-19 DIAGNOSIS — K50.90 CROHN'S DISEASE, UNSPECIFIED, WITHOUT COMPLICATIONS: ICD-10-CM

## 2022-10-19 DIAGNOSIS — R53.1 WEAKNESS: ICD-10-CM

## 2022-10-19 DIAGNOSIS — H53.8 OTHER VISUAL DISTURBANCES: ICD-10-CM

## 2022-10-19 DIAGNOSIS — Z20.822 CONTACT WITH AND (SUSPECTED) EXPOSURE TO COVID-19: ICD-10-CM

## 2022-10-19 DIAGNOSIS — R11.2 NAUSEA WITH VOMITING, UNSPECIFIED: ICD-10-CM

## 2022-10-19 DIAGNOSIS — R06.02 SHORTNESS OF BREATH: ICD-10-CM

## 2022-10-19 DIAGNOSIS — R07.9 CHEST PAIN, UNSPECIFIED: ICD-10-CM

## 2023-01-13 NOTE — PROGRESS NOTE ADULT - PROBLEM SELECTOR PROBLEM 4
Price (Do Not Change): 0.00 Instructions: This plan will send the code FBSD to the PM system.  DO NOT or CHANGE the price. Detail Level: Simple Hyponatremia
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
## 2023-08-11 ENCOUNTER — OUTPATIENT (OUTPATIENT)
Dept: OUTPATIENT SERVICES | Facility: HOSPITAL | Age: 58
LOS: 1 days | Discharge: HOME | End: 2023-08-11

## 2023-08-11 ENCOUNTER — EMERGENCY (EMERGENCY)
Facility: HOSPITAL | Age: 58
LOS: 1 days | Discharge: HOME | End: 2023-08-11
Attending: STUDENT IN AN ORGANIZED HEALTH CARE EDUCATION/TRAINING PROGRAM | Admitting: STUDENT IN AN ORGANIZED HEALTH CARE EDUCATION/TRAINING PROGRAM
Payer: COMMERCIAL

## 2023-08-11 VITALS
OXYGEN SATURATION: 96 % | RESPIRATION RATE: 16 BRPM | HEART RATE: 79 BPM | DIASTOLIC BLOOD PRESSURE: 85 MMHG | SYSTOLIC BLOOD PRESSURE: 127 MMHG | TEMPERATURE: 98 F

## 2023-08-11 VITALS
RESPIRATION RATE: 16 BRPM | OXYGEN SATURATION: 100 % | HEART RATE: 75 BPM | TEMPERATURE: 98 F | SYSTOLIC BLOOD PRESSURE: 153 MMHG | DIASTOLIC BLOOD PRESSURE: 94 MMHG

## 2023-08-11 PROBLEM — K50.90 CROHN'S DISEASE, UNSPECIFIED, WITHOUT COMPLICATIONS: Chronic | Status: ACTIVE | Noted: 2022-10-17

## 2023-08-11 LAB
ALBUMIN SERPL ELPH-MCNC: 4.2 G/DL — SIGNIFICANT CHANGE UP (ref 3.3–5)
ALP SERPL-CCNC: 59 U/L — SIGNIFICANT CHANGE UP (ref 40–120)
ALT FLD-CCNC: 14 U/L — SIGNIFICANT CHANGE UP (ref 4–41)
ANION GAP SERPL CALC-SCNC: 12 MMOL/L — SIGNIFICANT CHANGE UP (ref 7–14)
APPEARANCE UR: CLEAR — SIGNIFICANT CHANGE UP
AST SERPL-CCNC: 15 U/L — SIGNIFICANT CHANGE UP (ref 4–40)
BASOPHILS # BLD AUTO: 0.07 K/UL — SIGNIFICANT CHANGE UP (ref 0–0.2)
BASOPHILS NFR BLD AUTO: 0.9 % — SIGNIFICANT CHANGE UP (ref 0–2)
BILIRUB SERPL-MCNC: 0.5 MG/DL — SIGNIFICANT CHANGE UP (ref 0.2–1.2)
BILIRUB UR-MCNC: NEGATIVE — SIGNIFICANT CHANGE UP
BUN SERPL-MCNC: 16 MG/DL — SIGNIFICANT CHANGE UP (ref 7–23)
CALCIUM SERPL-MCNC: 9.2 MG/DL — SIGNIFICANT CHANGE UP (ref 8.4–10.5)
CHLORIDE SERPL-SCNC: 105 MMOL/L — SIGNIFICANT CHANGE UP (ref 98–107)
CK MB BLD-MCNC: 2.5 % — SIGNIFICANT CHANGE UP (ref 0–2.5)
CK MB CFR SERPL CALC: 2.4 NG/ML — SIGNIFICANT CHANGE UP
CK SERPL-CCNC: 96 U/L — SIGNIFICANT CHANGE UP (ref 30–200)
CO2 SERPL-SCNC: 20 MMOL/L — LOW (ref 22–31)
COLOR SPEC: YELLOW — SIGNIFICANT CHANGE UP
CREAT SERPL-MCNC: 0.81 MG/DL — SIGNIFICANT CHANGE UP (ref 0.5–1.3)
DIFF PNL FLD: NEGATIVE — SIGNIFICANT CHANGE UP
EGFR: 103 ML/MIN/1.73M2 — SIGNIFICANT CHANGE UP
EOSINOPHIL # BLD AUTO: 0.2 K/UL — SIGNIFICANT CHANGE UP (ref 0–0.5)
EOSINOPHIL NFR BLD AUTO: 2.6 % — SIGNIFICANT CHANGE UP (ref 0–6)
GLUCOSE SERPL-MCNC: 92 MG/DL — SIGNIFICANT CHANGE UP (ref 70–99)
GLUCOSE UR QL: NEGATIVE MG/DL — SIGNIFICANT CHANGE UP
HCT VFR BLD CALC: 37.8 % — LOW (ref 39–50)
HGB BLD-MCNC: 12.7 G/DL — LOW (ref 13–17)
IANC: 4.59 K/UL — SIGNIFICANT CHANGE UP (ref 1.8–7.4)
IMM GRANULOCYTES NFR BLD AUTO: 0.4 % — SIGNIFICANT CHANGE UP (ref 0–0.9)
KETONES UR-MCNC: NEGATIVE MG/DL — SIGNIFICANT CHANGE UP
LEUKOCYTE ESTERASE UR-ACNC: NEGATIVE — SIGNIFICANT CHANGE UP
LYMPHOCYTES # BLD AUTO: 2.17 K/UL — SIGNIFICANT CHANGE UP (ref 1–3.3)
LYMPHOCYTES # BLD AUTO: 28.4 % — SIGNIFICANT CHANGE UP (ref 13–44)
MCHC RBC-ENTMCNC: 30 PG — SIGNIFICANT CHANGE UP (ref 27–34)
MCHC RBC-ENTMCNC: 33.6 GM/DL — SIGNIFICANT CHANGE UP (ref 32–36)
MCV RBC AUTO: 89.4 FL — SIGNIFICANT CHANGE UP (ref 80–100)
MONOCYTES # BLD AUTO: 0.58 K/UL — SIGNIFICANT CHANGE UP (ref 0–0.9)
MONOCYTES NFR BLD AUTO: 7.6 % — SIGNIFICANT CHANGE UP (ref 2–14)
NEUTROPHILS # BLD AUTO: 4.59 K/UL — SIGNIFICANT CHANGE UP (ref 1.8–7.4)
NEUTROPHILS NFR BLD AUTO: 60.1 % — SIGNIFICANT CHANGE UP (ref 43–77)
NITRITE UR-MCNC: NEGATIVE — SIGNIFICANT CHANGE UP
NRBC # BLD: 0 /100 WBCS — SIGNIFICANT CHANGE UP (ref 0–0)
NRBC # FLD: 0 K/UL — SIGNIFICANT CHANGE UP (ref 0–0)
PCP SPEC-MCNC: SIGNIFICANT CHANGE UP
PCP SPEC-MCNC: SIGNIFICANT CHANGE UP
PH UR: 5.5 — SIGNIFICANT CHANGE UP (ref 5–8)
PLATELET # BLD AUTO: 221 K/UL — SIGNIFICANT CHANGE UP (ref 150–400)
POTASSIUM SERPL-MCNC: 3.7 MMOL/L — SIGNIFICANT CHANGE UP (ref 3.5–5.3)
POTASSIUM SERPL-SCNC: 3.7 MMOL/L — SIGNIFICANT CHANGE UP (ref 3.5–5.3)
PROT SERPL-MCNC: 6.8 G/DL — SIGNIFICANT CHANGE UP (ref 6–8.3)
PROT UR-MCNC: NEGATIVE MG/DL — SIGNIFICANT CHANGE UP
RBC # BLD: 4.23 M/UL — SIGNIFICANT CHANGE UP (ref 4.2–5.8)
RBC # FLD: 13.7 % — SIGNIFICANT CHANGE UP (ref 10.3–14.5)
SODIUM SERPL-SCNC: 137 MMOL/L — SIGNIFICANT CHANGE UP (ref 135–145)
SP GR SPEC: 1.01 — SIGNIFICANT CHANGE UP (ref 1–1.03)
TOXICOLOGY SCREEN, DRUGS OF ABUSE, SERUM RESULT: SIGNIFICANT CHANGE UP
TROPONIN T, HIGH SENSITIVITY RESULT: 12 NG/L — SIGNIFICANT CHANGE UP
TROPONIN T, HIGH SENSITIVITY RESULT: 12 NG/L — SIGNIFICANT CHANGE UP
TSH SERPL-MCNC: 2.13 UIU/ML — SIGNIFICANT CHANGE UP (ref 0.27–4.2)
UROBILINOGEN FLD QL: 0.2 MG/DL — SIGNIFICANT CHANGE UP (ref 0.2–1)
WBC # BLD: 7.64 K/UL — SIGNIFICANT CHANGE UP (ref 3.8–10.5)
WBC # FLD AUTO: 7.64 K/UL — SIGNIFICANT CHANGE UP (ref 3.8–10.5)

## 2023-08-11 PROCEDURE — 71046 X-RAY EXAM CHEST 2 VIEWS: CPT | Mod: 26

## 2023-08-11 PROCEDURE — 93010 ELECTROCARDIOGRAM REPORT: CPT

## 2023-08-11 PROCEDURE — 70450 CT HEAD/BRAIN W/O DYE: CPT | Mod: 26,MA

## 2023-08-11 PROCEDURE — 99284 EMERGENCY DEPT VISIT MOD MDM: CPT

## 2023-08-11 RX ORDER — ALPRAZOLAM 0.25 MG
0.25 TABLET ORAL ONCE
Refills: 0 | Status: DISCONTINUED | OUTPATIENT
Start: 2023-08-11 | End: 2023-08-11

## 2023-08-11 RX ORDER — SODIUM CHLORIDE 9 MG/ML
1000 INJECTION INTRAMUSCULAR; INTRAVENOUS; SUBCUTANEOUS ONCE
Refills: 0 | Status: COMPLETED | OUTPATIENT
Start: 2023-08-11 | End: 2023-08-11

## 2023-08-11 RX ORDER — DIPHENHYDRAMINE HCL 50 MG
50 CAPSULE ORAL ONCE
Refills: 0 | Status: COMPLETED | OUTPATIENT
Start: 2023-08-11 | End: 2023-08-11

## 2023-08-11 RX ADMIN — Medication 50 MILLIGRAM(S): at 15:11

## 2023-08-11 RX ADMIN — SODIUM CHLORIDE 1000 MILLILITER(S): 9 INJECTION INTRAMUSCULAR; INTRAVENOUS; SUBCUTANEOUS at 19:55

## 2023-08-11 RX ADMIN — Medication 0.25 MILLIGRAM(S): at 19:55

## 2023-08-11 NOTE — ED PROVIDER NOTE - NSFOLLOWUPINSTRUCTIONS_ED_ALL_ED_FT
Follow up at [facility]   Follow up with Internal Medicine     Worsening, continued or new concerning symptoms return to the emergency department.

## 2023-08-11 NOTE — ED PROVIDER NOTE - PATIENT PORTAL LINK FT
You can access the FollowMyHealth Patient Portal offered by [facility] by registering at the following website: [email or web address]. By joining [email or web address]’s FollowMyHealth portal, you will also be able to view your health information using other applications (apps) compatible with our system.

## 2023-08-11 NOTE — ED ADULT TRIAGE NOTE - CHIEF COMPLAINT QUOTE
Pt c/o chest tightness since yesterday. Received 324 of aspirin and X 1 sublingual nitroglycerin in the field. Per EMS was coming from psychiatrist office, reports has not taken antidepressants in about 4-5 months. Difficulty sleeping. Arrives with 20 G R AC. Denies SI/HI, AH/VH, drugs/alcohol use. Hx of anxiety, depression, HTN, Chron' s disease

## 2023-08-11 NOTE — ED ADULT NURSE REASSESSMENT NOTE - SYMPTOMS
Pt appears anxious, vs as noted. Resident at bedside to assess pt. Medicated as per orders.  reassessment to follow.

## 2023-08-11 NOTE — ED ADULT NURSE NOTE - OBJECTIVE STATEMENT
pt presents to ED c/o CP, SOB since this morning. pt A/Ox4, ambulatory, appears weak, lethargic, sts unable to sleep for a long time now, c/o dizziness, nausea (x1 episode of emesis)

## 2023-08-11 NOTE — ED PROVIDER NOTE - CLINICAL SUMMARY MEDICAL DECISION MAKING FREE TEXT BOX
57 y/o male with pmhx of benzo and oxycodone abuse s/p treatment, anxiety, depression, HTN, Crohn's disease, presents to ED c/o depression, anxiety, CP and SOB. Pt states he hasn't felt like himself in the past 6 months feeling depressed and "not feeling anything." States he feels lethargic and having trouble concentrating at work. States he has trouble sleeping and only 1 hour a night. Pt went to see his old psychiatrist at [facility] today however sent to ER because he started c/o CP and SOB yesterday. No pe risk factors. Pt last saw his psychiatrist over 1 year ago, states he stopped his psychiatric medications 6 months ago because he felt better but then got worse. Pt denies SI, HI, hallucinations. Eating and drinking normally. EKG nonischemic. concern for depression and anxiety, untreated. plan to check labs r/o MI, troponin, TSH, cxr, CT head, dc home with cards and psychiatry follow up 57 y/o male with pmhx of benzo and oxycodone abuse s/p treatment, anxiety, depression, HTN, Crohn's disease, presents to ED c/o depression, anxiety, CP and SOB. Pt states he hasn't felt like himself in the past 6 months feeling depressed and "not feeling anything." States he feels lethargic and having trouble concentrating at work. States he has trouble sleeping and only 1 hour a night. Pt went to see his old psychiatrist at [facility] today however sent to ER because he started c/o CP and SOB yesterday. No pe risk factors. Pt last saw his psychiatrist over 1 year ago, states he stopped his psychiatric medications 6 months ago because he felt better but then got worse. Pt denies SI, HI, hallucinations. Eating and drinking normally. EKG nonischemic. concern for depression and anxiety, untreated. plan to check labs r/o MI, troponin, TSH, cxr, CT head, dc home with cards and psychiatry follow up.

## 2023-08-11 NOTE — ED PROVIDER NOTE - OBJECTIVE STATEMENT
59 y/o male with pmhx of benzo and oxycodone abuse s/p treatment, anxiety, depression, HTN, Crohn's disease, presents to ED c/o depression, anxiety, CP and SOB. Pt states he hasn't felt like himself in the past 6 months feeling depressed and "not feeling anything." States he feels lethargic and having trouble at  States he has trouble sleeping and only 1 hour a night. Pt went to see his old psychiatrist at [facility] today however sent to ER because he started c/o CP and SOB yesterday. 57 y/o male with pmhx of benzo and oxycodone abuse s/p treatment, anxiety, depression, HTN, Crohn's disease, presents to ED c/o depression, anxiety, CP and SOB. Pt states he hasn't felt like himself in the past 6 months feeling depressed and "not feeling anything." States he feels lethargic and having trouble concentrating at work. States he has trouble sleeping and only 1 hour a night. Pt went to see his old psychiatrist at [facility] today however sent to ER because he started c/o CP and SOB yesterday. No recent travel, surgeries, hospitalizations, le edema, calf pain, hx of dvt/pe. Pt last saw his psychiatrist over 1 year ago, states he stopped his psychiatric medications 6 months ago because he felt better but then got worse. Pt denies SI, HI, hallucinations. Eating and drinking normally. Works as  at [employer]. 57 y/o male with pmhx of benzo and oxycodone abuse s/p treatment, anxiety, depression, HTN, Crohn's disease, presents to ED c/o depression, anxiety, CP and SOB. Pt states he hasn't felt like himself in the past 6 months feeling depressed and "not feeling anything." States he feels lethargic and having trouble concentrating at work. States he has trouble sleeping and only 1 hour a night. Pt went to see his old psychiatrist at [facility] today however sent to ER because he started c/o CP and SOB yesterday. No recent travel, surgeries, hospitalizations, le edema, calf pain, hx of dvt/pe. Pt last saw his psychiatrist over 1 year ago, states he stopped his psychiatric medications (Lexapro and Seroquel) 6 months ago because he felt better but then got worse. Pt denies SI, HI, hallucinations. Eating and drinking normally. Works as  at [employer].

## 2023-08-11 NOTE — ED PROVIDER NOTE - NSPTACCESSSVCSAPPTDETAILS_ED_ALL_ED_FT
Internal Medicine and Psychiatry follow up for depression Internal Medicine and Psychiatry follow up for depression  Cardiology follow up for chest pain

## 2023-08-11 NOTE — ED PROVIDER NOTE - PROGRESS NOTE DETAILS
LA Sanchez- Per wife, pt sleeps well at night and believes pt seeks medication to put him to sleep when he says he only sleeps an hour and believes he is depressed. Pt has felt like this for over a year. Got worse when pt and his family were evicted from there home and pt lost his job yrs ago. Wife concerned pt may have lost his job now, was suppose to go back to work today was suspended.  Amenable for AL home with [facility] center f/u tomorrow. LA Sanchez- spoke with pt wife will  pt in ED.

## 2023-08-11 NOTE — ED ADULT NURSE NOTE - NSFALLUNIVINTERV_ED_ALL_ED
Bed/Stretcher in lowest position, wheels locked, appropriate side rails in place/Call bell, personal items and telephone in reach/Instruct patient to call for assistance before getting out of bed/chair/stretcher/Non-slip footwear applied when patient is off stretcher/[place] to call system/Physically safe environment - no spills, clutter or unnecessary equipment/Purposeful proactive rounding/Room/bathroom lighting operational, light cord in reach

## 2023-08-11 NOTE — ED PROVIDER NOTE - NS ED ATTENDING STATEMENT MOD
This was a shared visit with the DANI. I reviewed and verified the documentation and independently performed the documented:

## 2023-08-11 NOTE — ED PROVIDER NOTE - NSFOLLOWUPCLINICS_GEN_ALL_ED_FT
[facility]  Behavioral Health  [id number] [place] [address]  Phone: [phone]  Fax:

## 2023-08-11 NOTE — ED PROVIDER NOTE - ATTENDING APP SHARED VISIT CONTRIBUTION OF CARE
I have evaluated the patient and agree with the documentation and assessment as made by the DANI. We have discussed plan of care and work up for the patient.   This was a shared visit with the DANI. I reviewed and verified the documentation and independently performed the documented:   History, Exam and Medical Decision Making.    58M, crohns, anxiety/depression, who presents with chest pain. Has been off psych meds for the past 6 months (self-discontinuation of lexapro/seroquel). During this time, he has been having difficulty sleeping and concentration. Denies SI/HI/AH/VH. No active psych care. Pt went to see his old psychiatrist at [facility] today however sent to ER because he started c/o CP and SOB yesterday. Non-exertional, non-positional. No hx of VTE. On ROS, denies headaches, fevers, chills, cough, sputum, abdominal pain, nvd, dysuria, hematuria, recent travel, trauma, syncope, black/bloody stools. Physical: Afebrile, well appearing, neck supple, no rash, rrr, ctabl, abdomen soft and ndnt, no le edema, stable gait. Plan: labs, EKG, CXR.

## 2023-08-15 DIAGNOSIS — F32.A DEPRESSION, UNSPECIFIED: ICD-10-CM

## 2023-08-15 DIAGNOSIS — F11.11 OPIOID ABUSE, IN REMISSION: ICD-10-CM

## 2024-01-02 NOTE — ED PROVIDER NOTE - OBJECTIVE STATEMENT
56 y/o Male with PMHx of HTN on "amlodipine and losartan but unsure of doses" and Crohn's disease on Stelara presents today feeling like he's going to "pass out" and feeling unwell since this morning. Patient states that he has missed a few doses of his HTN medicine recently. Patient states that he hasn't been sleeping very well for the past week due to work. Patient states that he had some chest pain and SOB this morning. Patient states that he was on the train to work today when he began to feel unwell and called EMS. Patient states that he experienced blurry vision and dizziness in the morning. Patient endorses an episode of vomiting and nausea yesterday. Patient has had multiple surgeries for Crohn's disease. Denies fever/chills, history of heart disease, cough, sore throat, weakness, numbness, seizures, HA, diarrhea, blood in stool, Yes 56 y/o Male with PMHx of HTN on "amlodipine and losartan" (unsure of doses) and Crohn's disease on Stelara presents today feeling like he's going to "pass out" and feeling unwell since this morning. Patient states that he has missed a few doses of his HTN medicine recently. Patient states that he hasn't been sleeping very well for the past week due to work. Patient states that he had some chest pain and SOB this morning. Patient states that he was on the train to work today when he began to feel unwell and called EMS. Patient states that he experienced blurry vision and dizziness in the morning. Patient endorses an episode of vomiting and nausea yesterday. Patient has had multiple surgeries for Crohn's disease. Denies fever/chills, history of heart disease, cough, sore throat, weakness, numbness, seizures, HA, diarrhea, blood in stool, 56 y/o Male with PMHx of HTN on "amlodipine and losartan" (unsure of doses) and Crohn's disease on Stelara presents today feeling like he's going to "pass out" and feeling unwell since this morning. Patient states that he has missed a few doses of his anti-hypertensive recently. Patient states that he hasn't been sleeping very well for the past week due to work. Patient states that he had some SOB this morning. Denies CP. Patient states that he was on the train to work today when he began to feel unwell and called EMS. Patient states that he experienced blurry vision and dizziness in the morning. Patient endorses an episode of vomiting and nausea yesterday. Patient has had multiple surgeries for Crohn's disease. Denies CP, fever/chills, history of heart disease, cough, sore throat, weakness, numbness, seizures, HA, diarrhea, blood in stool. 56 y/o Male with PMHx of HTN on "amlodipine and losartan" (unsure of doses) and Crohn's disease on Stelara presents today feeling like he's going to "pass out" and feeling unwell since this morning. Patient states that he has missed a few doses of his anti-hypertensive med recently. Patient states that he hasn't been sleeping very well for the past week due to work. Patient states that he had some SOB this morning. Denies CP. Patient states that he was on the train to work today when he began to feel unwell and called EMS. Patient states that he experienced blurry vision and dizziness in the morning which has resolved. Patient endorses an episode of nbnb vomiting and nausea yesterday. Patient has had multiple surgeries for Crohn's disease. Denies CP, fever/chills, abd pain, history of heart disease, cough, sore throat, weakness, numbness, seizures, HA, LOC, syncope, diarrhea, blood in stool. 56 y/o Male with PMHx of HTN on "amlodipine and losartan" (unsure of doses) and Crohn's disease on Stelara presents today feeling like he's going to "pass out" and feeling unwell since this morning. Patient states that he has missed a few doses of his anti-hypertensive meds recently. Patient states that he hasn't been sleeping very well for the past week due to work. Patient states that he had some SOB this morning. Denies CP. Patient states that he was on the train to work today when he began to feel unwell and called EMS. Patient states that he experienced blurry vision and dizziness in the morning which has resolved. Patient endorses an episode of nbnb vomiting and nausea yesterday. Patient has had multiple surgeries for Crohn's disease. Denies CP, fever/chills, abd pain, history of heart disease, cough, sore throat, weakness, numbness, seizures, HA, LOC, syncope, diarrhea, blood in stool.

## 2024-02-08 NOTE — H&P ADULT - PROBLEM SELECTOR PLAN 1
no Cardiac vs psychiatric etiology due to severe anxiety;   Former smoker, FHx of MI in father, age 65  -Telemetry  -Trend CE  -Cardiology c/s in AM - Dr. Dean Mitchell  -EKG: no acute changes, repeat PRN CP  -TTE  -TSH wnl  -CXR: not contributory   -f/u A1c, Lipid panel Cardiac vs psychiatric etiology due to anxiety vs panic ?attack;  Former smoker, FHx of MI in father, age 65; Low suspicion of ACS at this time;   -Telemetry  -Trend CE  -Cardiology c/s in AM - Dr. Dean Mitchell  -EKG: no acute Tw or ST changes, repeat PRN CP  -TTE  -TSH wnl  -CXR: not contributory   -f/u A1c, Lipid panel

## 2024-02-26 NOTE — H&P ADULT - PROBLEM SELECTOR PLAN 4
3 (mild pain) -s/p 40meq of KCl in ED  -Magnesium 1g IV x 1 dose now Sodium 133; On Lexapro, low dose;  -Monitor closely as on SSRI, may need to hold if sodium trends down

## 2025-03-07 NOTE — ED PROVIDER NOTE - TOBACCO USE
Sending in metformin.  Will call patient when repeat A1c is ready to be drawn   Current every day smoker